# Patient Record
Sex: MALE | Race: WHITE | NOT HISPANIC OR LATINO | Employment: OTHER | URBAN - METROPOLITAN AREA
[De-identification: names, ages, dates, MRNs, and addresses within clinical notes are randomized per-mention and may not be internally consistent; named-entity substitution may affect disease eponyms.]

---

## 2017-04-11 ENCOUNTER — OFFICE VISIT (OUTPATIENT)
Dept: INTERNAL MEDICINE | Facility: CLINIC | Age: 82
End: 2017-04-11
Payer: MEDICARE

## 2017-04-11 VITALS
RESPIRATION RATE: 12 BRPM | WEIGHT: 203.25 LBS | BODY MASS INDEX: 28.45 KG/M2 | SYSTOLIC BLOOD PRESSURE: 139 MMHG | DIASTOLIC BLOOD PRESSURE: 78 MMHG | HEART RATE: 60 BPM | TEMPERATURE: 99 F | HEIGHT: 71 IN

## 2017-04-11 DIAGNOSIS — N40.0 BENIGN PROSTATIC HYPERPLASIA, PRESENCE OF LOWER URINARY TRACT SYMPTOMS UNSPECIFIED, UNSPECIFIED MORPHOLOGY: ICD-10-CM

## 2017-04-11 DIAGNOSIS — R42 DIZZINESS: Primary | ICD-10-CM

## 2017-04-11 DIAGNOSIS — G43.709 CHRONIC MIGRAINE WITHOUT AURA WITHOUT STATUS MIGRAINOSUS, NOT INTRACTABLE: ICD-10-CM

## 2017-04-11 DIAGNOSIS — I10 ESSENTIAL HYPERTENSION: ICD-10-CM

## 2017-04-11 DIAGNOSIS — E78.00 HYPERCHOLESTEREMIA: ICD-10-CM

## 2017-04-11 DIAGNOSIS — N18.30 CKD (CHRONIC KIDNEY DISEASE), STAGE III: ICD-10-CM

## 2017-04-11 PROCEDURE — 99999 PR PBB SHADOW E&M-EST. PATIENT-LVL III: CPT | Mod: PBBFAC,,, | Performed by: INTERNAL MEDICINE

## 2017-04-11 PROCEDURE — 1157F ADVNC CARE PLAN IN RCRD: CPT | Mod: S$GLB,,, | Performed by: INTERNAL MEDICINE

## 2017-04-11 PROCEDURE — 99213 OFFICE O/P EST LOW 20 MIN: CPT | Mod: S$GLB,,, | Performed by: INTERNAL MEDICINE

## 2017-04-11 PROCEDURE — 99499 UNLISTED E&M SERVICE: CPT | Mod: S$GLB,,, | Performed by: INTERNAL MEDICINE

## 2017-04-11 PROCEDURE — 1159F MED LIST DOCD IN RCRD: CPT | Mod: S$GLB,,, | Performed by: INTERNAL MEDICINE

## 2017-04-11 PROCEDURE — 1160F RVW MEDS BY RX/DR IN RCRD: CPT | Mod: S$GLB,,, | Performed by: INTERNAL MEDICINE

## 2017-04-11 PROCEDURE — 1126F AMNT PAIN NOTED NONE PRSNT: CPT | Mod: S$GLB,,, | Performed by: INTERNAL MEDICINE

## 2017-04-11 RX ORDER — MECLIZINE HCL 12.5 MG 12.5 MG/1
12.5 TABLET ORAL 3 TIMES DAILY PRN
Qty: 50 TABLET | Refills: 3 | Status: SHIPPED | OUTPATIENT
Start: 2017-04-11 | End: 2017-07-13

## 2017-04-11 NOTE — PROGRESS NOTES
Subjective:       Patient ID: Gera Moreau is a 87 y.o. male.    Chief Complaint: Dizziness (symptoms x 1 week)    HPI  Pt unsteady on his feet, has positional dizziness lasting for up to a minute at a time w/o claudia vertigo.  Having some tinnitus in his L ear.  He lost hearing completely in his L ear 3 months ago.  Review of Systems    Objective:      Physical Exam   Constitutional: He appears well-developed. No distress.   HENT:   Head: Normocephalic.   Right Ear: External ear normal.   Left Ear: External ear normal.   TMs OK   Eyes: EOM are normal. No scleral icterus.   Neck: Normal range of motion. No tracheal deviation present.   Cardiovascular: Normal rate, regular rhythm and intact distal pulses.    Pulmonary/Chest: Effort normal. No respiratory distress.   Abdominal: He exhibits no distension.   Musculoskeletal: Normal range of motion. He exhibits no edema.   Neurological: He is alert.   Skin: Skin is warm and dry. No rash noted. He is not diaphoretic. No erythema.   Psychiatric: He has a normal mood and affect. His behavior is normal.   Vitals reviewed.      Assessment:       1. Dizziness    2. Essential hypertension    3. Benign prostatic hyperplasia, presence of lower urinary tract symptoms unspecified, unspecified morphology    4. Chronic migraine without aura without status migrainosus, not intractable    5. CKD (chronic kidney disease), stage III    6. Hypercholesteremia        Plan:       Gera was seen today for dizziness.    Diagnoses and all orders for this visit:    Dizziness  -     meclizine (ANTIVERT) 12.5 mg tablet; Take 1 tablet (12.5 mg total) by mouth 3 (three) times daily as needed.    Essential hypertension   Well-cont    Benign prostatic hyperplasia, presence of lower urinary tract symptoms unspecified, unspecified morphology    Chronic migraine without aura without status migrainosus, not intractable    CKD (chronic kidney disease), stage III    Hypercholesteremia      Return if  symptoms worsen or fail to improve.

## 2017-04-11 NOTE — MR AVS SNAPSHOT
[facility]  [address] [place] [address], [address]  [place] [address]  Phone: [phone]  Fax: [phone]                  Gera Moreau   2017 10:15 AM   Office Visit    Description:  Male : 1929   Provider:  Craig Mejia MD   Department:  [facility] Medicine           Reason for Visit     Dizziness           Diagnoses this Visit        Comments    Dizziness    -  Primary     Essential hypertension         Benign prostatic hyperplasia, presence of lower urinary tract symptoms unspecified, unspecified morphology         Chronic migraine without aura without status migrainosus, not intractable         CKD (chronic kidney disease), stage III         Hypercholesteremia                To Do List           Goals (5 Years of Data)     None      Follow-Up and Disposition     Return if symptoms worsen or fail to improve.       These Medications        Disp Refills Start End    meclizine (ANTIVERT) 12.5 mg tablet 50 tablet 3 2017     Take 1 tablet (12.5 mg total) by mouth 3 (three) times daily as needed. - Oral    Pharmacy: [facility]/pharmacy #[id number] - [address] AT [facility] Ph #: [phone]         [facility]     [facility] Nurse Care Line -  Assistance  Unless otherwise directed by your provider, please contact [facility], our nurse care line that is available for  assistance.     Registered nurses in the [facility] provide: appointment scheduling, clinical advisement, health education, and other advisory services.  Call: [phone] (toll free)               Medications           START taking these NEW medications        Refills    meclizine (ANTIVERT) 12.5 mg tablet 3    Sig: Take 1 tablet (12.5 mg total) by mouth 3 (three) times daily as needed.    Class: Normal    Route: Oral           Verify that the below list of medications is an accurate representation of the medications you are  "currently taking.  If none reported, the list may be blank. If incorrect, please contact your healthcare provider. Carry this list with you in case of emergency.           Current Medications     finasteride (PROSCAR) 5 mg tablet TAKE 1 TABLET ONE TIME DAILY    hydrochlorothiazide (MICROZIDE) 12.5 mg capsule TAKE 1 CAPSULE ONE TIME DAILY    metoprolol tartrate (LOPRESSOR) 50 MG tablet TAKE 1 TABLET ONE TIME DAILY    meclizine (ANTIVERT) 12.5 mg tablet Take 1 tablet (12.5 mg total) by mouth 3 (three) times daily as needed.           Clinical Reference Information           Your Vitals Were     BP Pulse Temp Resp Height Weight    139/78 60 98.5 °F (36.9 °C) 12 5' 11" (1.803 m) 92.2 kg (203 lb 4.2 oz)    BMI                28.35 kg/m2          Blood Pressure          Most Recent Value    BP  139/78      Allergies as of 4/11/2017     Pcn [Penicillins]      Immunizations Administered on Date of Encounter - 4/11/2017     None      [facility] Sign-Up     Activating your [facility] account is as easy as 1-2-3!     1) Visit [email or web address], select Sign Up Now, enter this activation code and your date of birth, then select Next.  [email or web address]  Expires: 5/26/2017 10:29 AM      2) Create a username and password to use when you visit [facility] in the future and select a security question in case you lose your password and select Next.    3) Enter your e-mail address and click Sign Up!    Additional Information  If you have questions, please e-mail [email or web address] or call [phone] to talk to our [facility] staff. Remember, [facility] is NOT to be used for urgent needs. For medical emergencies, dial 911.         Language Assistance Services     ATTENTION: Language assistance services are available, free of charge. Please call [phone].      ATENCIÓN: Si habla español, tiene a mckinley disposición servicios gratuitos de asistencia lingüística. Llame al [phone].     CHÚ Ý: N?u b?n nói Ti?ng Vi?t, có các d?ch v? h? " tr? paula cabezas? mi?n phí dành cho b?n. G?i s? [phone].         [facility] complies with applicable Federal civil rights laws and does not discriminate on the basis of race, color, national origin, age, disability, or sex.

## 2017-07-09 ENCOUNTER — HOSPITAL ENCOUNTER (EMERGENCY)
Facility: HOSPITAL | Age: 82
Discharge: HOME | End: 2017-07-09
Attending: EMERGENCY MEDICINE
Payer: MEDICARE

## 2017-07-09 VITALS
SYSTOLIC BLOOD PRESSURE: 154 MMHG | HEART RATE: 88 BPM | TEMPERATURE: 98 F | DIASTOLIC BLOOD PRESSURE: 73 MMHG | RESPIRATION RATE: 18 BRPM | HEIGHT: 72 IN | BODY MASS INDEX: 27.09 KG/M2 | WEIGHT: 200 LBS

## 2017-07-09 DIAGNOSIS — I83.892 BLEEDING FROM VARICOSE VEIN, LEFT: Primary | ICD-10-CM

## 2017-07-09 PROCEDURE — 99284 EMERGENCY DEPT VISIT MOD MDM: CPT

## 2017-07-09 PROCEDURE — 37799 UNLISTED PX VASCULAR SURGERY: CPT | Mod: LT

## 2017-07-09 RX ORDER — LIDOCAINE HYDROCHLORIDE 10 MG/ML
5 INJECTION INFILTRATION; PERINEURAL
Status: DISCONTINUED | OUTPATIENT
Start: 2017-07-09 | End: 2017-07-10 | Stop reason: HOSPADM

## 2017-07-09 RX ORDER — LIDOCAINE HYDROCHLORIDE 10 MG/ML
INJECTION INFILTRATION; PERINEURAL
Status: DISCONTINUED
Start: 2017-07-09 | End: 2017-07-10 | Stop reason: HOSPADM

## 2017-07-09 RX ORDER — LIDOCAINE HYDROCHLORIDE AND EPINEPHRINE 10; 10 MG/ML; UG/ML
1 INJECTION, SOLUTION INFILTRATION; PERINEURAL
Status: DISCONTINUED | OUTPATIENT
Start: 2017-07-09 | End: 2017-07-09

## 2017-07-09 RX ORDER — SILVER NITRATE 38.21; 12.74 MG/1; MG/1
STICK TOPICAL
Status: DISCONTINUED
Start: 2017-07-09 | End: 2017-07-09 | Stop reason: WASHOUT

## 2017-07-10 NOTE — ED PROVIDER NOTES
Encounter Date: 7/9/2017       History     Chief Complaint   Patient presents with    Varicose Veins     Left ankle varicose vein bleeding x15 minutes prior to EMS arrival.  Pressure dressing applied by EMS, bleeding started spontaneously without any hx of injury/trauma     HPI:  89 YO M presents to the ED with a CC of varicose vein bleeding on the left inner ankle. Patient states that approximately 1 hour ago he got out of the shower and was drying off with a towel when the area started bleeding. Patient tried applying pressure x 20 minutes but was unable to control it. He is not on any anticoagulation medications. Patient denies any pain or injury. No numbness or tingling to the left foot.          Review of patient's allergies indicates:   Allergen Reactions    Pcn [penicillins] Rash     Past Medical History:   Diagnosis Date    BPH (benign prostatic hyperplasia)     Gynecomastia     Hyperlipidemia     Hypertension     PSVT (paroxysmal supraventricular tachycardia)     Spondylosis      Past Surgical History:   Procedure Laterality Date    COLONOSCOPY  2009    HERNIA REPAIR  1952    umbilical     Family History   Problem Relation Age of Onset    Cancer Sister     Cancer Brother     Heart disease Brother      Social History   Substance Use Topics    Smoking status: Never Smoker    Smokeless tobacco: Never Used    Alcohol use No     Review of Systems   Constitutional: Negative for chills and fever.   HENT: Negative for sore throat.    Eyes: Negative for visual disturbance.   Respiratory: Negative for shortness of breath.    Cardiovascular: Negative for chest pain.   Gastrointestinal: Negative for abdominal pain, nausea and vomiting.   Genitourinary: Negative for dysuria.   Musculoskeletal: Negative for back pain.   Skin: Negative for rash.        See HPI   Neurological: Negative for dizziness, weakness and numbness.   Hematological: Does not bruise/bleed easily.   All other systems reviewed and are  negative.      Physical Exam     Initial Vitals [07/09/17 2052]   BP Pulse Resp Temp SpO2   (!) 154/73 88 18 98.4 °F (36.9 °C) --      MAP       100         Physical Exam    Nursing note and vitals reviewed.  Constitutional: He appears well-developed and well-nourished.   HENT:   Head: Normocephalic and atraumatic.   Right Ear: External ear normal.   Left Ear: External ear normal.   Mouth/Throat: Oropharynx is clear and moist.   Eyes: EOM are normal. Pupils are equal, round, and reactive to light.   Neck: Normal range of motion. Neck supple.   Cardiovascular: Normal rate, regular rhythm, normal heart sounds and intact distal pulses.   Pulmonary/Chest: Breath sounds normal. No respiratory distress.   Abdominal: Soft.   Musculoskeletal: Normal range of motion.   PT and DP pulse 2+   Neurological: He is alert and oriented to person, place, and time. He has normal strength.   Skin: Skin is warm. Capillary refill takes less than 2 seconds.        Inspection of the left lower extremity reveals a very small superficial vein that is actively bleeding. No ecchymosis. No hematoma noted         ED Course   Procedures  Labs Reviewed - No data to display          Medical Decision Making:   Initial Assessment:   89 YO M presents to the ED with a CC of varicose vein bleeding on the left inner ankle. Patient states that approximately 1 hour ago he got out of the shower and was drying off with a towel when the area started bleeding. Patient tried applying pressure x 20 minutes but was unable to control it. He is not on any anticoagulation medications. Patient denies any pain or injury. No numbness or tingling to the left foot.  Differential Diagnosis:   Bleeding varicose vein  ED Management:  Pressure was applied x 30 minutes. Tried Surgicel to stop bleeding without success. Area cleansed with Betadine and 2 CC of Lidocaine 1% injected in the area. Dr Nichols placed 2 figure 8 Ethilon 5-0 sutures around the vein. Bleeding stopped.  Hemostasis achieved. Patient Neurovasc intact pre and post procedure. Patient tolerated well. PT and DP pulse intact.  Wound dressed with 4x4 and Coban. Patient to f/u with PCP in 3 days for suture removal  Other:   I discussed test(s) with the performing physician.              Attending Attestation:     Physician Attestation Statement for NP/PA:   I have conducted a face to face encounter with this patient in addition to the NP/PA, due to Medical Complexity    Other NP/PA Attestation Additions:    History of Present Illness: Agree   Physical Exam: agree    Procedure Note: Performed by me                ED Course     Clinical Impression:   The encounter diagnosis was Bleeding from varicose vein, left.    Disposition:   Disposition: Discharged  Condition: Stable                        Atilio Nichols MD  07/10/17 0551

## 2017-07-13 ENCOUNTER — OFFICE VISIT (OUTPATIENT)
Dept: INTERNAL MEDICINE | Facility: CLINIC | Age: 82
End: 2017-07-13
Payer: MEDICARE

## 2017-07-13 VITALS
TEMPERATURE: 99 F | WEIGHT: 200.94 LBS | OXYGEN SATURATION: 97 % | DIASTOLIC BLOOD PRESSURE: 64 MMHG | SYSTOLIC BLOOD PRESSURE: 138 MMHG | HEIGHT: 72 IN | HEART RATE: 88 BPM | BODY MASS INDEX: 27.21 KG/M2 | RESPIRATION RATE: 20 BRPM

## 2017-07-13 DIAGNOSIS — I10 ESSENTIAL HYPERTENSION: ICD-10-CM

## 2017-07-13 DIAGNOSIS — N18.30 CKD (CHRONIC KIDNEY DISEASE), STAGE III: ICD-10-CM

## 2017-07-13 DIAGNOSIS — E78.00 HYPERCHOLESTEREMIA: ICD-10-CM

## 2017-07-13 DIAGNOSIS — G43.709 CHRONIC MIGRAINE WITHOUT AURA WITHOUT STATUS MIGRAINOSUS, NOT INTRACTABLE: ICD-10-CM

## 2017-07-13 DIAGNOSIS — N40.0 BENIGN PROSTATIC HYPERPLASIA WITHOUT LOWER URINARY TRACT SYMPTOMS: ICD-10-CM

## 2017-07-13 DIAGNOSIS — Z48.02 VISIT FOR SUTURE REMOVAL: Primary | ICD-10-CM

## 2017-07-13 PROCEDURE — 99213 OFFICE O/P EST LOW 20 MIN: CPT | Mod: S$GLB,,, | Performed by: INTERNAL MEDICINE

## 2017-07-13 PROCEDURE — 1126F AMNT PAIN NOTED NONE PRSNT: CPT | Mod: S$GLB,,, | Performed by: INTERNAL MEDICINE

## 2017-07-13 PROCEDURE — 99999 PR PBB SHADOW E&M-EST. PATIENT-LVL III: CPT | Mod: PBBFAC,,, | Performed by: INTERNAL MEDICINE

## 2017-07-13 PROCEDURE — 1159F MED LIST DOCD IN RCRD: CPT | Mod: S$GLB,,, | Performed by: INTERNAL MEDICINE

## 2017-07-13 PROCEDURE — 99024 POSTOP FOLLOW-UP VISIT: CPT | Mod: S$GLB,,, | Performed by: INTERNAL MEDICINE

## 2017-07-13 PROCEDURE — 99499 UNLISTED E&M SERVICE: CPT | Mod: S$GLB,,, | Performed by: INTERNAL MEDICINE

## 2017-10-09 ENCOUNTER — LAB VISIT (OUTPATIENT)
Dept: LAB | Facility: HOSPITAL | Age: 82
End: 2017-10-09
Attending: INTERNAL MEDICINE
Payer: MEDICARE

## 2017-10-09 DIAGNOSIS — N18.30 CKD (CHRONIC KIDNEY DISEASE), STAGE III: ICD-10-CM

## 2017-10-09 DIAGNOSIS — E78.00 HYPERCHOLESTEREMIA: ICD-10-CM

## 2017-10-09 LAB
ALBUMIN SERPL BCP-MCNC: 4.2 G/DL
ALP SERPL-CCNC: 82 U/L
ALT SERPL W/O P-5'-P-CCNC: 27 U/L
ANION GAP SERPL CALC-SCNC: 10 MMOL/L
AST SERPL-CCNC: 24 U/L
BILIRUB SERPL-MCNC: 1.5 MG/DL
BUN SERPL-MCNC: 18 MG/DL
CALCIUM SERPL-MCNC: 9.4 MG/DL
CHLORIDE SERPL-SCNC: 101 MMOL/L
CHOLEST SERPL-MCNC: 166 MG/DL
CHOLEST/HDLC SERPL: 4 {RATIO}
CO2 SERPL-SCNC: 30 MMOL/L
CREAT SERPL-MCNC: 0.8 MG/DL
EST. GFR  (AFRICAN AMERICAN): >60 ML/MIN/1.73 M^2
EST. GFR  (NON AFRICAN AMERICAN): >60 ML/MIN/1.73 M^2
GLUCOSE SERPL-MCNC: 106 MG/DL
HDLC SERPL-MCNC: 42 MG/DL
HDLC SERPL: 25.3 %
LDLC SERPL CALC-MCNC: 103.6 MG/DL
NONHDLC SERPL-MCNC: 124 MG/DL
POTASSIUM SERPL-SCNC: 3.3 MMOL/L
PROT SERPL-MCNC: 6.5 G/DL
SODIUM SERPL-SCNC: 141 MMOL/L
TRIGL SERPL-MCNC: 102 MG/DL

## 2017-10-09 PROCEDURE — 80053 COMPREHEN METABOLIC PANEL: CPT | Mod: PO

## 2017-10-09 PROCEDURE — 80061 LIPID PANEL: CPT

## 2017-10-09 PROCEDURE — 36415 COLL VENOUS BLD VENIPUNCTURE: CPT | Mod: PO

## 2017-10-13 ENCOUNTER — OFFICE VISIT (OUTPATIENT)
Dept: INTERNAL MEDICINE | Facility: CLINIC | Age: 82
End: 2017-10-13
Payer: MEDICARE

## 2017-10-13 VITALS
HEART RATE: 60 BPM | RESPIRATION RATE: 20 BRPM | SYSTOLIC BLOOD PRESSURE: 114 MMHG | HEIGHT: 72 IN | WEIGHT: 200.5 LBS | TEMPERATURE: 97 F | BODY MASS INDEX: 27.16 KG/M2 | DIASTOLIC BLOOD PRESSURE: 78 MMHG

## 2017-10-13 DIAGNOSIS — G43.709 CHRONIC MIGRAINE WITHOUT AURA WITHOUT STATUS MIGRAINOSUS, NOT INTRACTABLE: ICD-10-CM

## 2017-10-13 DIAGNOSIS — G47.62 NOCTURNAL LEG CRAMPS: ICD-10-CM

## 2017-10-13 DIAGNOSIS — L30.9 ECZEMA, UNSPECIFIED TYPE: ICD-10-CM

## 2017-10-13 DIAGNOSIS — E78.00 HYPERCHOLESTEREMIA: ICD-10-CM

## 2017-10-13 DIAGNOSIS — Z23 NEED FOR PROPHYLACTIC VACCINATION AND INOCULATION AGAINST INFLUENZA: ICD-10-CM

## 2017-10-13 DIAGNOSIS — I10 ESSENTIAL HYPERTENSION: Primary | ICD-10-CM

## 2017-10-13 DIAGNOSIS — N40.0 BENIGN PROSTATIC HYPERPLASIA WITHOUT LOWER URINARY TRACT SYMPTOMS: ICD-10-CM

## 2017-10-13 DIAGNOSIS — N18.30 CKD (CHRONIC KIDNEY DISEASE), STAGE III: ICD-10-CM

## 2017-10-13 PROCEDURE — 99999 PR PBB SHADOW E&M-EST. PATIENT-LVL III: CPT | Mod: PBBFAC,,, | Performed by: INTERNAL MEDICINE

## 2017-10-13 PROCEDURE — 99499 UNLISTED E&M SERVICE: CPT | Mod: S$GLB,,, | Performed by: INTERNAL MEDICINE

## 2017-10-13 PROCEDURE — G0008 ADMIN INFLUENZA VIRUS VAC: HCPCS | Mod: S$GLB,,, | Performed by: INTERNAL MEDICINE

## 2017-10-13 PROCEDURE — 90662 IIV NO PRSV INCREASED AG IM: CPT | Mod: S$GLB,,, | Performed by: INTERNAL MEDICINE

## 2017-10-13 PROCEDURE — 99214 OFFICE O/P EST MOD 30 MIN: CPT | Mod: S$GLB,,, | Performed by: INTERNAL MEDICINE

## 2017-10-13 RX ORDER — CLOBETASOL PROPIONATE 0.5 MG/G
OINTMENT TOPICAL 2 TIMES DAILY
Qty: 60 G | Refills: 3 | Status: SHIPPED | OUTPATIENT
Start: 2017-10-13 | End: 2019-01-22 | Stop reason: SDUPTHER

## 2017-10-13 RX ORDER — TIZANIDINE HYDROCHLORIDE 2 MG/1
1 CAPSULE, GELATIN COATED ORAL NIGHTLY
Qty: 30 CAPSULE | Refills: 2 | Status: SHIPPED | OUTPATIENT
Start: 2017-10-13 | End: 2017-10-23

## 2017-10-13 NOTE — PROGRESS NOTES
Subjective:       Patient ID: Gera Moreau is a 88 y.o. male.    Chief Complaint: Follow-up and Flu Vaccine    HPI  Checkup.  Labs reviewed.  C/O leg cramps qhs, L foot frequently burns.  No claudication.  No recent HA.  No CP, SOB.  Nocturia x 1.  No significant arthralgias.  Review of Systems   All other systems reviewed and are negative.      Objective:      Physical Exam   Constitutional: He appears well-developed. No distress.   HENT:   Head: Normocephalic.   Eyes: EOM are normal. No scleral icterus.   Neck: Normal range of motion. No tracheal deviation present.   Cardiovascular: Normal rate, regular rhythm, normal heart sounds and intact distal pulses.    Pulmonary/Chest: Effort normal and breath sounds normal. No respiratory distress.   Abdominal: Soft. Bowel sounds are normal. He exhibits no distension.   Musculoskeletal: Normal range of motion. He exhibits edema (1+ ankles).   Neurological: He is alert.   Skin: Skin is warm and dry. No rash noted. He is not diaphoretic. No erythema.   Psychiatric: He has a normal mood and affect. His behavior is normal.   Vitals reviewed.      Assessment:       1. Essential hypertension    2. Chronic migraine without aura without status migrainosus, not intractable    3. CKD (chronic kidney disease), stage III    4. Hypercholesteremia    5. Benign prostatic hyperplasia without lower urinary tract symptoms    6. Nocturnal leg cramps    7. Need for prophylactic vaccination and inoculation against influenza    8. Eczema, unspecified type        Plan:       Gera was seen today for follow-up and flu vaccine.    Diagnoses and all orders for this visit:    Essential hypertension    Well-cont    Chronic migraine without aura without status migrainosus, not intractable   Stable    CKD (chronic kidney disease), stage III   Stable    Hypercholesteremia   Well-cont    Benign prostatic hyperplasia without lower urinary tract symptoms   Stable    Nocturnal leg cramps  -      tizanidine 2 mg Cap; Take 1 capsule (2 mg total) by mouth every evening.    Need for prophylactic vaccination and inoculation against influenza  -     Influenza - High Dose (65+) (PF) (IM)    Eczema, unspecified type  -     clobetasol 0.05% (TEMOVATE) 0.05 % Oint; Apply topically 2 (two) times daily.      Return in about 6 months (around 4/13/2018).

## 2017-11-08 RX ORDER — HYDROCHLOROTHIAZIDE 12.5 MG/1
CAPSULE ORAL
Qty: 90 CAPSULE | Refills: 4 | Status: SHIPPED | OUTPATIENT
Start: 2017-11-08 | End: 2018-06-13

## 2017-11-08 RX ORDER — FINASTERIDE 5 MG/1
TABLET, FILM COATED ORAL
Qty: 90 TABLET | Refills: 4 | Status: SHIPPED | OUTPATIENT
Start: 2017-11-08 | End: 2019-01-22 | Stop reason: SDUPTHER

## 2017-11-08 RX ORDER — METOPROLOL TARTRATE 50 MG/1
TABLET ORAL
Qty: 90 TABLET | Refills: 4 | Status: SHIPPED | OUTPATIENT
Start: 2017-11-08 | End: 2018-06-13

## 2018-01-16 ENCOUNTER — PES CALL (OUTPATIENT)
Dept: ADMINISTRATIVE | Facility: CLINIC | Age: 83
End: 2018-01-16

## 2018-05-16 ENCOUNTER — OFFICE VISIT (OUTPATIENT)
Dept: INTERNAL MEDICINE | Facility: CLINIC | Age: 83
End: 2018-05-16
Payer: MEDICARE

## 2018-05-16 VITALS
HEART RATE: 80 BPM | SYSTOLIC BLOOD PRESSURE: 144 MMHG | DIASTOLIC BLOOD PRESSURE: 70 MMHG | RESPIRATION RATE: 18 BRPM | BODY MASS INDEX: 27.53 KG/M2 | WEIGHT: 203.25 LBS | HEIGHT: 72 IN | TEMPERATURE: 99 F

## 2018-05-16 DIAGNOSIS — D69.2 SENILE PURPURA: ICD-10-CM

## 2018-05-16 DIAGNOSIS — N41.0 ACUTE PROSTATITIS: Primary | ICD-10-CM

## 2018-05-16 DIAGNOSIS — I10 ESSENTIAL HYPERTENSION: ICD-10-CM

## 2018-05-16 LAB
BILIRUB SERPL-MCNC: ABNORMAL MG/DL
BLOOD URINE, POC: ABNORMAL
COLOR, POC UA: ABNORMAL
GLUCOSE UR QL STRIP: ABNORMAL
KETONES UR QL STRIP: ABNORMAL
LEUKOCYTE ESTERASE URINE, POC: ABNORMAL
NITRITE, POC UA: ABNORMAL
PH, POC UA: 8
PROTEIN, POC: ABNORMAL
SPECIFIC GRAVITY, POC UA: 1.02
UROBILINOGEN, POC UA: 1

## 2018-05-16 PROCEDURE — 99499 UNLISTED E&M SERVICE: CPT | Mod: S$GLB,,, | Performed by: INTERNAL MEDICINE

## 2018-05-16 PROCEDURE — 81002 URINALYSIS NONAUTO W/O SCOPE: CPT | Mod: S$GLB,,, | Performed by: INTERNAL MEDICINE

## 2018-05-16 PROCEDURE — 99213 OFFICE O/P EST LOW 20 MIN: CPT | Mod: 25,S$GLB,, | Performed by: INTERNAL MEDICINE

## 2018-05-16 PROCEDURE — 99999 PR PBB SHADOW E&M-EST. PATIENT-LVL III: CPT | Mod: PBBFAC,,, | Performed by: INTERNAL MEDICINE

## 2018-05-16 RX ORDER — CIPROFLOXACIN 250 MG/1
250 TABLET, FILM COATED ORAL 2 TIMES DAILY
Qty: 28 TABLET | Refills: 0 | Status: SHIPPED | OUTPATIENT
Start: 2018-05-16 | End: 2018-05-30

## 2018-05-16 NOTE — PROGRESS NOTES
Subjective:       Patient ID: Gera Moreau is a 88 y.o. male.    Chief Complaint: Urinary Tract Infection (burning, cloudy and blood noticed in urine x 2 days)    HPI  Pt with 2 days of dyuria and hematuria, passing clots.  No F/C, no back pain.  Review of Systems    Objective:      Physical Exam   Constitutional: He appears well-developed. No distress.   HENT:   Head: Normocephalic.   Eyes: EOM are normal. No scleral icterus.   Neck: Normal range of motion. No tracheal deviation present.   Cardiovascular: Normal rate, regular rhythm and intact distal pulses.    Pulmonary/Chest: Effort normal. No respiratory distress.   Abdominal: He exhibits no distension.   Musculoskeletal: Normal range of motion. He exhibits no edema.   Neurological: He is alert.   Skin: Skin is warm and dry. No rash noted. He is not diaphoretic. No erythema.   Purpura UEs   Psychiatric: He has a normal mood and affect. His behavior is normal.   Vitals reviewed.      Assessment:       1. Acute prostatitis    2. Essential hypertension    3. Senile purpura        Plan:       Gera was seen today for urinary tract infection.    Diagnoses and all orders for this visit:    Acute prostatitis  -     ciprofloxacin HCl (CIPRO) 250 MG tablet; Take 1 tablet (250 mg total) by mouth 2 (two) times daily.  -     POCT urine dipstick without microscope    Essential hypertension  -     Renal function panel; Future    Senile purpura   observe    Follow-up in about 1 month (around 6/16/2018).

## 2018-06-08 ENCOUNTER — LAB VISIT (OUTPATIENT)
Dept: LAB | Facility: HOSPITAL | Age: 83
End: 2018-06-08
Attending: INTERNAL MEDICINE
Payer: MEDICARE

## 2018-06-08 DIAGNOSIS — I10 ESSENTIAL HYPERTENSION: ICD-10-CM

## 2018-06-08 LAB
ALBUMIN SERPL BCP-MCNC: 4.1 G/DL
ANION GAP SERPL CALC-SCNC: 9 MMOL/L
BUN SERPL-MCNC: 15 MG/DL
CALCIUM SERPL-MCNC: 9.6 MG/DL
CHLORIDE SERPL-SCNC: 104 MMOL/L
CO2 SERPL-SCNC: 31 MMOL/L
CREAT SERPL-MCNC: 0.81 MG/DL
EST. GFR  (AFRICAN AMERICAN): >60 ML/MIN/1.73 M^2
EST. GFR  (NON AFRICAN AMERICAN): >60 ML/MIN/1.73 M^2
GLUCOSE SERPL-MCNC: 113 MG/DL
PHOSPHATE SERPL-MCNC: 3.1 MG/DL
POTASSIUM SERPL-SCNC: 4.7 MMOL/L
SODIUM SERPL-SCNC: 144 MMOL/L

## 2018-06-08 PROCEDURE — 36415 COLL VENOUS BLD VENIPUNCTURE: CPT | Mod: PO

## 2018-06-08 PROCEDURE — 80069 RENAL FUNCTION PANEL: CPT | Mod: PO

## 2018-06-13 ENCOUNTER — OFFICE VISIT (OUTPATIENT)
Dept: INTERNAL MEDICINE | Facility: CLINIC | Age: 83
End: 2018-06-13
Payer: MEDICARE

## 2018-06-13 ENCOUNTER — TELEPHONE (OUTPATIENT)
Dept: INTERNAL MEDICINE | Facility: CLINIC | Age: 83
End: 2018-06-13

## 2018-06-13 ENCOUNTER — LAB VISIT (OUTPATIENT)
Dept: LAB | Facility: HOSPITAL | Age: 83
End: 2018-06-13
Attending: INTERNAL MEDICINE
Payer: MEDICARE

## 2018-06-13 VITALS
TEMPERATURE: 98 F | RESPIRATION RATE: 12 BRPM | SYSTOLIC BLOOD PRESSURE: 108 MMHG | DIASTOLIC BLOOD PRESSURE: 64 MMHG | BODY MASS INDEX: 27.67 KG/M2 | HEART RATE: 36 BPM | WEIGHT: 204.25 LBS | HEIGHT: 72 IN

## 2018-06-13 DIAGNOSIS — I10 ESSENTIAL HYPERTENSION: ICD-10-CM

## 2018-06-13 DIAGNOSIS — R00.1 SINUS BRADYCARDIA: Primary | ICD-10-CM

## 2018-06-13 DIAGNOSIS — R00.1 SINUS BRADYCARDIA: ICD-10-CM

## 2018-06-13 DIAGNOSIS — M72.2 PLANTAR FASCIITIS: ICD-10-CM

## 2018-06-13 LAB — TSH SERPL DL<=0.005 MIU/L-ACNC: 2 UIU/ML

## 2018-06-13 PROCEDURE — 93005 ELECTROCARDIOGRAM TRACING: CPT | Mod: S$GLB,,, | Performed by: INTERNAL MEDICINE

## 2018-06-13 PROCEDURE — 99999 PR PBB SHADOW E&M-EST. PATIENT-LVL III: CPT | Mod: PBBFAC,,, | Performed by: INTERNAL MEDICINE

## 2018-06-13 PROCEDURE — 99214 OFFICE O/P EST MOD 30 MIN: CPT | Mod: S$GLB,,, | Performed by: INTERNAL MEDICINE

## 2018-06-13 PROCEDURE — 36415 COLL VENOUS BLD VENIPUNCTURE: CPT | Mod: PO

## 2018-06-13 PROCEDURE — 84443 ASSAY THYROID STIM HORMONE: CPT | Mod: PO

## 2018-06-13 PROCEDURE — 99499 UNLISTED E&M SERVICE: CPT | Mod: S$GLB,,, | Performed by: INTERNAL MEDICINE

## 2018-06-13 PROCEDURE — 93010 ELECTROCARDIOGRAM REPORT: CPT | Mod: S$GLB,,, | Performed by: INTERNAL MEDICINE

## 2018-06-13 RX ORDER — LOSARTAN POTASSIUM AND HYDROCHLOROTHIAZIDE 12.5; 5 MG/1; MG/1
1 TABLET ORAL DAILY
Qty: 90 TABLET | Refills: 3 | Status: SHIPPED | OUTPATIENT
Start: 2018-06-13 | End: 2019-06-14 | Stop reason: SDUPTHER

## 2018-06-13 NOTE — PROGRESS NOTES
Subjective:       Patient ID: Gera Moreau is a 88 y.o. male.    Chief Complaint: Fatigue (c/o low pulse rate x 2 days) and Heel Pain (symptoms to right foot x 2 weeks, denies injury)    HPI  Pt c/o fatigue and weakness x 2 days, unable to do light yard work.  Denies CP, SOB.  Also c/o 2 weeks of R heel pain worse after resting.  Review of Systems   All other systems reviewed and are negative.      Objective:      Physical Exam   Constitutional: He appears well-developed. No distress.   HENT:   Head: Normocephalic.   Eyes: EOM are normal. No scleral icterus.   Neck: Normal range of motion. No tracheal deviation present.   Cardiovascular: Regular rhythm, normal heart sounds and intact distal pulses.    Bradycardic 36-44   Pulmonary/Chest: Effort normal and breath sounds normal. No respiratory distress.   Abdominal: Soft. Bowel sounds are normal. He exhibits no distension. There is no tenderness.   Musculoskeletal: Normal range of motion. He exhibits edema (trace ankles) and tenderness (R heel).   Neurological: He is alert.   Skin: Skin is warm and dry. No rash noted. He is not diaphoretic. No erythema.   Psychiatric: He has a normal mood and affect. His behavior is normal.   Vitals reviewed.      Assessment:       1. Sinus bradycardia    2. Essential hypertension    3. Plantar fasciitis        Plan:       Gera was seen today for fatigue and heel pain.    Diagnoses and all orders for this visit:    Sinus bradycardia   D/C lopressor   TSH    Essential hypertension  -     EKG 12-lead; Future   Hyzaar 50/12.5 qd    Plantar fasciitis   Arch supports    No Follow-up on file.

## 2018-06-13 NOTE — PATIENT INSTRUCTIONS
Plantar Fasciitis  Plantar fasciitis is a painful swelling of the plantar fascia. The plantar fascia is a thick, fibrous layer of tissue. It covers the bones on the bottom of your foot. And it supports the foot bones in an arched position.  This can happen gradually or suddenly. It usually affects one foot at a time. Heel pain can be sharp, like a knife sticking into the bottom of your foot. You may feel pain after exercising, long-distance jogging, stair climbing, long periods of standing, or after standing up.  Risk factors include: non-active lifestyle, arthritis, diabetes, obesity or recent weight gain, flat foot, high arch. Wearing high heels, loose shoes, or shoes with poor arch support for long periods of time adds to the risk. This problem is commonly found in runners and dancers. It also found in people who stand on hard surfaces for long periods of time.  Foot pain from this condition is usually worse in the morning. But it improves with walking. By the end of the day there may be a dull aching. Treatment requires short-term rest and controlling swelling. It may take up to 9 months before all symptoms go away. Rarely, a steroid injection into the foot, or surgery, may be needed.  Home care  · If you are overweight, lose weight to help healing.  · Choose supportive shoes with good arch support and shock absorbency. Replace athletic shoes when they become worn out. Dont walk or run barefoot.  · Premade or custom-fitted shoe inserts may be helpful. Inserts made of silicone seem to be the most effective. Custom-made inserts can be provided by a podiatrist or foot specialist, physical therapist, or orthopedist.  · Premade or custom-made night splints keep the heel stretched out while you sleep. They may prevent morning pain.  · Avoid activities that stress the feet: jogging, prolonged standing or walking, contact sports, etc.  · First thing in the morning and before sports, stretch the bottom of your feet.  Gently flex your ankle so the toes move toward your knee.  · Icing may help control heel pain. Apply an ice pack to the heel for 10-20 minutes as a preventive. Or ice your heel after a severe flare-up of symptoms. You may repeat this every 1-2 hours as needed.  · You may use over-the-counter pain medicine to control pain, unless another medicine was prescribed. Anti-inflammatory pain medicines, such as ibuprofen or naproxen, may work better than acetaminophen. If you have chronic liver or kidney disease or ever had a stomach ulcer or GI bleeding, talk with your healthcare provider before using these medicines.  Follow-up care  Follow up with your healthcare provider, physical therapist, or podiatrist or foot specialist as advised.  Call for an appointment if pain worsens or there is no relief after a few weeks of home treatment. Shoe inserts, a night splint, or a special boot may be required.  If X-rays were taken, you will be told of any new findings that may affect your care.  When to seek medical advice  Call your healthcare provider right away if any of these occur:  · Foot swelling  · Redness with increasing pain  Date Last Reviewed: 11/21/2015  © 5948-2465 [employer]. [address] [place] [address]. All rights reserved. This information is not intended as a substitute for professional medical care. Always follow your healthcare professional's instructions.        Treating Plantar Fasciitis  First, your healthcare provider tries to determine the cause of your problem in order to suggest ways to relieve pain. If your pain is due to poor foot mechanics, custom-made shoe inserts (orthoses) may help.    Reduce symptoms  · To relieve mild symptoms, try aspirin, ibuprofen, or other medicines as directed. Rubbing ice on the affected area may also help.  · To reduce severe pain and swelling, your healthcare provider may prescribe pills or injections or a walking cast in some instances.  Physical therapy, such as ultrasound or a daily stretching program, may also be recommended. Surgery is rarely required.  · To reduce symptoms caused by poor foot mechanics, your foot may be taped. This supports the arch and temporarily controls movement. Night splints may also help by stretching the fascia.  Control movement  If taping helps, your healthcare provider may prescribe orthoses. Built from plaster casts of your feet, these inserts control the way your foot moves. As a result, your symptoms should go away.  Reduce overuse  Every time your foot strikes the ground, the plantar fascia is stretched. You can reduce the strain on the plantar fascia and the possibility of overuse by following these suggestions:  · Lose any excess weight.  · Avoid running on hard or uneven ground.  · Use orthoses at all times in your shoes and house slippers.  If surgery is needed  Your healthcare provider may consider surgery if other types of treatment don't control your pain. During surgery, the plantar fascia is partially cut to release tension. As you heal, fibrous tissue fills the space between the heel bone and the plantar fascia.   Date Last Reviewed: 10/14/2015  © 8221-7037 [employer]. [address] [place] [address]. All rights reserved. This information is not intended as a substitute for professional medical care. Always follow your healthcare professional's instructions.

## 2018-06-13 NOTE — TELEPHONE ENCOUNTER
----- Message from Sonam Ren sent at 6/13/2018 12:01 PM CDT -----  Contact: self / [phone]  Patient wife called to see if her  can come in today , his pulse is low and he is fatigue. Wife is concerned. Please advise

## 2018-06-20 ENCOUNTER — OFFICE VISIT (OUTPATIENT)
Dept: INTERNAL MEDICINE | Facility: CLINIC | Age: 83
End: 2018-06-20
Payer: MEDICARE

## 2018-06-20 VITALS
WEIGHT: 204.13 LBS | HEIGHT: 72 IN | OXYGEN SATURATION: 97 % | BODY MASS INDEX: 27.65 KG/M2 | DIASTOLIC BLOOD PRESSURE: 70 MMHG | SYSTOLIC BLOOD PRESSURE: 122 MMHG | HEART RATE: 70 BPM | TEMPERATURE: 97 F

## 2018-06-20 DIAGNOSIS — R00.1 SINUS BRADYCARDIA: Primary | ICD-10-CM

## 2018-06-20 DIAGNOSIS — M72.2 PLANTAR FASCIITIS: ICD-10-CM

## 2018-06-20 DIAGNOSIS — I10 ESSENTIAL HYPERTENSION: ICD-10-CM

## 2018-06-20 PROCEDURE — 99999 PR PBB SHADOW E&M-EST. PATIENT-LVL III: CPT | Mod: PBBFAC,,, | Performed by: INTERNAL MEDICINE

## 2018-06-20 PROCEDURE — 99213 OFFICE O/P EST LOW 20 MIN: CPT | Mod: S$GLB,,, | Performed by: INTERNAL MEDICINE

## 2018-06-20 NOTE — PROGRESS NOTES
Subjective:       Patient ID: Gera Moreau is a 89 y.o. male.    Chief Complaint: Follow-up (1 week)    HPI  Recheck for bradycardia.  Feeling better off of the metoprolol, no weakness or lightheadedness.  R foot also feeling better.  Review of Systems    Objective:      Physical Exam   Constitutional: He appears well-developed. No distress.   HENT:   Head: Normocephalic.   Eyes: EOM are normal. No scleral icterus.   Neck: Normal range of motion. No tracheal deviation present.   Cardiovascular: Normal rate, regular rhythm and intact distal pulses.    Pulmonary/Chest: Effort normal. No respiratory distress.   Abdominal: He exhibits no distension.   Musculoskeletal: Normal range of motion. He exhibits no edema.   Neurological: He is alert.   Skin: Skin is warm and dry. No rash noted. He is not diaphoretic. No erythema.   Psychiatric: He has a normal mood and affect. His behavior is normal.   Vitals reviewed.      Assessment:       1. Sinus bradycardia    2. Essential hypertension    3. Plantar fasciitis        Plan:       Gera was seen today for follow-up.    Diagnoses and all orders for this visit:    Sinus bradycardia   Resolved    Essential hypertension   Well-cont    Plantar fasciitis   Continue arch supports    Follow-up in about 6 months (around 12/20/2018).

## 2018-07-20 ENCOUNTER — PES CALL (OUTPATIENT)
Dept: ADMINISTRATIVE | Facility: CLINIC | Age: 83
End: 2018-07-20

## 2018-09-24 ENCOUNTER — CLINICAL SUPPORT (OUTPATIENT)
Dept: INTERNAL MEDICINE | Facility: CLINIC | Age: 83
End: 2018-09-24
Payer: MEDICARE

## 2018-09-24 DIAGNOSIS — Z23 FLU VACCINE NEED: Primary | ICD-10-CM

## 2018-09-24 PROCEDURE — 90662 IIV NO PRSV INCREASED AG IM: CPT | Mod: PBBFAC,PN

## 2018-09-24 NOTE — PROGRESS NOTES
Patient here for flu vaccine, no pain noted. Injection given, tolerated well. Advised to wait in lobby 15 minutes. Report any adverse reactions.

## 2019-01-22 ENCOUNTER — OFFICE VISIT (OUTPATIENT)
Dept: INTERNAL MEDICINE | Facility: CLINIC | Age: 84
End: 2019-01-22
Payer: MEDICARE

## 2019-01-22 VITALS
HEIGHT: 72 IN | OXYGEN SATURATION: 95 % | HEART RATE: 60 BPM | WEIGHT: 206.81 LBS | SYSTOLIC BLOOD PRESSURE: 130 MMHG | DIASTOLIC BLOOD PRESSURE: 80 MMHG | BODY MASS INDEX: 28.01 KG/M2

## 2019-01-22 DIAGNOSIS — R31.0 GROSS HEMATURIA: Primary | ICD-10-CM

## 2019-01-22 DIAGNOSIS — D69.2 SENILE PURPURA: ICD-10-CM

## 2019-01-22 DIAGNOSIS — L30.9 ECZEMA, UNSPECIFIED TYPE: ICD-10-CM

## 2019-01-22 DIAGNOSIS — N40.0 BENIGN PROSTATIC HYPERPLASIA WITHOUT LOWER URINARY TRACT SYMPTOMS: ICD-10-CM

## 2019-01-22 DIAGNOSIS — J06.9 UPPER RESPIRATORY TRACT INFECTION, UNSPECIFIED TYPE: ICD-10-CM

## 2019-01-22 DIAGNOSIS — I10 ESSENTIAL HYPERTENSION: ICD-10-CM

## 2019-01-22 PROBLEM — N18.30 CKD (CHRONIC KIDNEY DISEASE), STAGE III: Status: RESOLVED | Noted: 2017-07-13 | Resolved: 2019-01-22

## 2019-01-22 LAB
BILIRUB SERPL-MCNC: NEGATIVE MG/DL
BLOOD URINE, POC: NEGATIVE
COLOR, POC UA: YELLOW
GLUCOSE UR QL STRIP: NEGATIVE
KETONES UR QL STRIP: NEGATIVE
LEUKOCYTE ESTERASE URINE, POC: NEGATIVE
NITRITE, POC UA: NEGATIVE
PH, POC UA: 6
PROTEIN, POC: NEGATIVE
SPECIFIC GRAVITY, POC UA: 1.01
UROBILINOGEN, POC UA: 0.2

## 2019-01-22 PROCEDURE — 1101F PR PT FALLS ASSESS DOC 0-1 FALLS W/OUT INJ PAST YR: ICD-10-PCS | Mod: CPTII,S$GLB,, | Performed by: INTERNAL MEDICINE

## 2019-01-22 PROCEDURE — 99999 PR PBB SHADOW E&M-EST. PATIENT-LVL III: CPT | Mod: PBBFAC,,, | Performed by: INTERNAL MEDICINE

## 2019-01-22 PROCEDURE — 1101F PT FALLS ASSESS-DOCD LE1/YR: CPT | Mod: CPTII,S$GLB,, | Performed by: INTERNAL MEDICINE

## 2019-01-22 PROCEDURE — 99214 OFFICE O/P EST MOD 30 MIN: CPT | Mod: 25,S$GLB,, | Performed by: INTERNAL MEDICINE

## 2019-01-22 PROCEDURE — 81002 URINALYSIS NONAUTO W/O SCOPE: CPT | Mod: S$GLB,,, | Performed by: INTERNAL MEDICINE

## 2019-01-22 PROCEDURE — 99214 PR OFFICE/OUTPT VISIT, EST, LEVL IV, 30-39 MIN: ICD-10-PCS | Mod: 25,S$GLB,, | Performed by: INTERNAL MEDICINE

## 2019-01-22 PROCEDURE — 81002 POCT URINE DIPSTICK WITHOUT MICROSCOPE: ICD-10-PCS | Mod: S$GLB,,, | Performed by: INTERNAL MEDICINE

## 2019-01-22 PROCEDURE — 99499 RISK ADDL DX/OHS AUDIT: ICD-10-PCS | Mod: S$GLB,,, | Performed by: INTERNAL MEDICINE

## 2019-01-22 PROCEDURE — 99499 UNLISTED E&M SERVICE: CPT | Mod: S$GLB,,, | Performed by: INTERNAL MEDICINE

## 2019-01-22 PROCEDURE — 99999 PR PBB SHADOW E&M-EST. PATIENT-LVL III: ICD-10-PCS | Mod: PBBFAC,,, | Performed by: INTERNAL MEDICINE

## 2019-01-22 RX ORDER — CLOBETASOL PROPIONATE 0.5 MG/G
OINTMENT TOPICAL 2 TIMES DAILY
Qty: 60 G | Refills: 3 | Status: SHIPPED | OUTPATIENT
Start: 2019-01-22 | End: 2020-10-19 | Stop reason: SDUPTHER

## 2019-01-22 RX ORDER — FINASTERIDE 5 MG/1
TABLET, FILM COATED ORAL
Qty: 90 TABLET | Refills: 4 | Status: SHIPPED | OUTPATIENT
Start: 2019-01-22 | End: 2020-03-23

## 2019-01-22 RX ORDER — HYDROCODONE BITARTRATE AND HOMATROPINE METHYLBROMIDE ORAL SOLUTION 5; 1.5 MG/5ML; MG/5ML
5 LIQUID ORAL EVERY 4 HOURS PRN
Qty: 175 ML | Refills: 0 | Status: SHIPPED | OUTPATIENT
Start: 2019-01-22 | End: 2019-02-01

## 2019-01-22 NOTE — PROGRESS NOTES
Subjective:       Patient ID: Gera Moreau is a 89 y.o. male.    Chief Complaint: Generalized Body Aches; Urinary Tract Infection (possible); and Chest Pain (on right side when coughing)    HPI  Pt c/o myalgias, cough, CP with cough.  No SOB, F/C.  Also c/o gross hematuria, consisting of a few drops of blood before urinary stram every 12-14 days.  No dysuria or frequency.  Has some mild intermittent R sided abd pain.  Review of Systems   All other systems reviewed and are negative.      Objective:      Physical Exam   Constitutional: He appears well-developed. No distress.   HENT:   Head: Normocephalic.   Eyes: EOM are normal. No scleral icterus.   Neck: Normal range of motion. No tracheal deviation present.   Cardiovascular: Normal rate, regular rhythm, normal heart sounds and intact distal pulses.   Pulmonary/Chest: Effort normal and breath sounds normal. No respiratory distress.   Abdominal: Soft. Bowel sounds are normal. He exhibits no distension.   Musculoskeletal: Normal range of motion. He exhibits no edema.   Neurological: He is alert.   Skin: Skin is warm and dry. No rash noted. He is not diaphoretic. No erythema.   Purpuric exts   Psychiatric: He has a normal mood and affect. His behavior is normal.   Vitals reviewed.      Assessment:       1. Gross hematuria    2. Upper respiratory tract infection, unspecified type    3. Eczema, unspecified type    4. Benign prostatic hyperplasia without lower urinary tract symptoms    5. Essential hypertension    6. Senile purpura        Plan:       Gera was seen today for generalized body aches, urinary tract infection and chest pain.    Diagnoses and all orders for this visit:    Gross hematuria  -     POCT urine dipstick without microscope    Upper respiratory tract infection, unspecified type  -     hydrocodone-homatropine 5-1.5 mg/5 ml (HYCODAN) 5-1.5 mg/5 mL Syrp; Take 5 mLs by mouth every 4 (four) hours as needed (cough).    Eczema, unspecified type  -      clobetasol 0.05% (TEMOVATE) 0.05 % Oint; Apply topically 2 (two) times daily. for 10 days    Benign prostatic hyperplasia without lower urinary tract symptoms  -     finasteride (PROSCAR) 5 mg tablet; TAKE 1 TABLET ONE TIME DAILY    Essential hypertension   Well-cont    Senile purpura   monitor    No Follow-up on file.

## 2019-03-09 ENCOUNTER — HOSPITAL ENCOUNTER (EMERGENCY)
Facility: HOSPITAL | Age: 84
Discharge: HOME | End: 2019-03-09
Attending: EMERGENCY MEDICINE
Payer: MEDICARE

## 2019-03-09 VITALS
HEIGHT: 73 IN | RESPIRATION RATE: 18 BRPM | HEART RATE: 57 BPM | DIASTOLIC BLOOD PRESSURE: 66 MMHG | TEMPERATURE: 99 F | OXYGEN SATURATION: 95 % | BODY MASS INDEX: 27.3 KG/M2 | WEIGHT: 206 LBS | SYSTOLIC BLOOD PRESSURE: 147 MMHG

## 2019-03-09 DIAGNOSIS — I83.92 VARICOSE VEINS OF LEFT LOWER EXTREMITY, UNSPECIFIED WHETHER COMPLICATED: Primary | ICD-10-CM

## 2019-03-09 DIAGNOSIS — S81.802A WOUND OF LEFT LEG, INITIAL ENCOUNTER: ICD-10-CM

## 2019-03-09 PROCEDURE — 25000003 PHARM REV CODE 250: Mod: HCNC,ER | Performed by: EMERGENCY MEDICINE

## 2019-03-09 PROCEDURE — 99283 EMERGENCY DEPT VISIT LOW MDM: CPT | Mod: HCNC,ER

## 2019-03-09 RX ADMIN — BACITRACIN, NEOMYCIN, POLYMYXIN B 1 EACH: 400; 3.5; 5 OINTMENT TOPICAL at 04:03

## 2019-03-09 NOTE — ED PROVIDER NOTES
Encounter Date: 3/9/2019       History     Chief Complaint   Patient presents with    Bleeding/Bruising     EMS report called to pt home for report of bleeding to left inner ankle area, states appears to have varicose veins.  Pt reports previous incident approx 1 year PTA.  Bleeding controlled on pt arrival.         03/09/2019  4:02 PM    Chief Complaint:  Left inner ankle bleeding.      The patient is a 89 y.o. male presenting with left inner ankle bleeding after scratching varicose vein.  This happen only 1 previous occasion and the stitch had to be put in.  Bleeding has stopped on its own prior to arrival.  Patient arrived by EMS.  Patient has no other complaints. Patient denies being on blood thinner are aspirin.        Patient has a past medical history of BPH (benign prostatic hyperplasia), Gynecomastia, Hyperlipidemia, Hypertension, PSVT (paroxysmal supraventricular tachycardia), and Spondylosis.  Patient has a past surgical history that includes Colonoscopy (2009) and Hernia repair (1952).          Review of patient's allergies indicates:   Allergen Reactions    Metoprolol Other (See Comments)     bradycardia    Pcn [penicillins] Rash     Past Medical History:   Diagnosis Date    BPH (benign prostatic hyperplasia)     Gynecomastia     Hyperlipidemia     Hypertension     PSVT (paroxysmal supraventricular tachycardia)     Spondylosis      Past Surgical History:   Procedure Laterality Date    COLONOSCOPY  2009    HERNIA REPAIR  1952    umbilical     Family History   Problem Relation Age of Onset    Cancer Sister     Cancer Brother     Heart disease Brother      Social History     Tobacco Use    Smoking status: Never Smoker    Smokeless tobacco: Never Used   Substance Use Topics    Alcohol use: No    Drug use: No     Review of Systems   Hematological: Bruises/bleeds easily.   All other systems reviewed and are negative.      Physical Exam     Initial Vitals [03/09/19 1554]   BP Pulse Resp Temp  SpO2   (!) 147/66 60 18 99.3 °F (37.4 °C) 97 %      MAP       --         Physical Exam    Nursing note and vitals reviewed.  Constitutional: He appears well-developed and well-nourished.   HENT:   Head: Normocephalic and atraumatic.   Right Ear: External ear normal.   Left Ear: External ear normal.   Nose: Nose normal.   Mouth/Throat: Oropharynx is clear and moist.   Eyes: Conjunctivae and EOM are normal. Pupils are equal, round, and reactive to light.   Neck: Normal range of motion. Neck supple.   Cardiovascular: Normal rate, regular rhythm, normal heart sounds and intact distal pulses.   Pulmonary/Chest: Breath sounds normal.   Abdominal: Soft. Bowel sounds are normal.   Musculoskeletal: Normal range of motion.   Dorsal pedal pulse 2 +bilaterally.   Neurological: He is alert. GCS score is 15. GCS eye subscore is 4. GCS verbal subscore is 5. GCS motor subscore is 6.   Skin: Skin is warm. Capillary refill takes less than 2 seconds.   Small tear over left inner ankle varicose vein.  Bleeding now controlled.         ED Course   Procedures  Labs Reviewed - No data to display       Imaging Results    None                               Clinical Impression:       ICD-10-CM ICD-9-CM   1. Varicose veins of left lower extremity, unspecified whether complicated I83.92 454.9   2. Wound of left leg, initial encounter S81.802A 894.0                                Jerald Muse MD  03/09/19 1028

## 2019-05-08 ENCOUNTER — PES CALL (OUTPATIENT)
Dept: ADMINISTRATIVE | Facility: CLINIC | Age: 84
End: 2019-05-08

## 2019-06-14 DIAGNOSIS — I10 ESSENTIAL HYPERTENSION: ICD-10-CM

## 2019-06-14 RX ORDER — LOSARTAN POTASSIUM AND HYDROCHLOROTHIAZIDE 12.5; 5 MG/1; MG/1
TABLET ORAL
Qty: 90 TABLET | Refills: 3 | Status: SHIPPED | OUTPATIENT
Start: 2019-06-14 | End: 2020-08-03 | Stop reason: SDUPTHER

## 2019-09-17 ENCOUNTER — CLINICAL SUPPORT (OUTPATIENT)
Dept: INTERNAL MEDICINE | Facility: CLINIC | Age: 84
End: 2019-09-17
Payer: MEDICARE

## 2019-09-17 DIAGNOSIS — Z23 FLU VACCINE NEED: Primary | ICD-10-CM

## 2019-09-17 PROCEDURE — G0008 ADMIN INFLUENZA VIRUS VAC: HCPCS | Mod: HCNC,S$GLB,, | Performed by: INTERNAL MEDICINE

## 2019-09-17 PROCEDURE — 90662 FLU VACCINE - HIGH DOSE (65+) PRESERVATIVE FREE IM: ICD-10-PCS | Mod: HCNC,S$GLB,, | Performed by: INTERNAL MEDICINE

## 2019-09-17 PROCEDURE — 90662 IIV NO PRSV INCREASED AG IM: CPT | Mod: HCNC,S$GLB,, | Performed by: INTERNAL MEDICINE

## 2019-09-17 PROCEDURE — G0008 FLU VACCINE - HIGH DOSE (65+) PRESERVATIVE FREE IM: ICD-10-PCS | Mod: HCNC,S$GLB,, | Performed by: INTERNAL MEDICINE

## 2019-09-17 NOTE — PROGRESS NOTES
Patient to clinic for flu shot   Pt tolerated well  No redness or swelling noted to injection site  Injection given in L deltoid

## 2019-10-15 ENCOUNTER — PES CALL (OUTPATIENT)
Dept: ADMINISTRATIVE | Facility: CLINIC | Age: 84
End: 2019-10-15

## 2020-03-22 DIAGNOSIS — N40.0 BENIGN PROSTATIC HYPERPLASIA WITHOUT LOWER URINARY TRACT SYMPTOMS: ICD-10-CM

## 2020-03-23 RX ORDER — FINASTERIDE 5 MG/1
TABLET, FILM COATED ORAL
Qty: 90 TABLET | Refills: 4 | Status: SHIPPED | OUTPATIENT
Start: 2020-03-23 | End: 2021-06-29 | Stop reason: SDUPTHER

## 2020-10-12 DIAGNOSIS — D72.829 LEUKOCYTOSIS, UNSPECIFIED TYPE: ICD-10-CM

## 2020-10-12 DIAGNOSIS — I10 ESSENTIAL HYPERTENSION: Primary | ICD-10-CM

## 2020-10-15 ENCOUNTER — LAB VISIT (OUTPATIENT)
Dept: LAB | Facility: HOSPITAL | Age: 85
End: 2020-10-15
Attending: INTERNAL MEDICINE
Payer: MEDICARE

## 2020-10-15 DIAGNOSIS — I10 ESSENTIAL HYPERTENSION: ICD-10-CM

## 2020-10-15 DIAGNOSIS — D72.829 LEUKOCYTOSIS, UNSPECIFIED TYPE: ICD-10-CM

## 2020-10-15 LAB
ALBUMIN SERPL BCP-MCNC: 4 G/DL (ref 3.5–5.2)
ALP SERPL-CCNC: 82 U/L (ref 38–126)
ALT SERPL W/O P-5'-P-CCNC: 11 U/L (ref 10–44)
ANION GAP SERPL CALC-SCNC: 7 MMOL/L (ref 8–16)
AST SERPL-CCNC: 22 U/L (ref 15–46)
BASOPHILS # BLD AUTO: 0.05 K/UL (ref 0–0.2)
BASOPHILS NFR BLD: 0.7 % (ref 0–1.9)
BILIRUB SERPL-MCNC: 1.3 MG/DL (ref 0.1–1)
BUN SERPL-MCNC: 17 MG/DL (ref 2–20)
CALCIUM SERPL-MCNC: 9.3 MG/DL (ref 8.7–10.5)
CHLORIDE SERPL-SCNC: 103 MMOL/L (ref 95–110)
CO2 SERPL-SCNC: 31 MMOL/L (ref 23–29)
CREAT SERPL-MCNC: 0.76 MG/DL (ref 0.5–1.4)
DIFFERENTIAL METHOD: ABNORMAL
EOSINOPHIL # BLD AUTO: 0.5 K/UL (ref 0–0.5)
EOSINOPHIL NFR BLD: 8 % (ref 0–8)
ERYTHROCYTE [DISTWIDTH] IN BLOOD BY AUTOMATED COUNT: 13.6 % (ref 11.5–14.5)
EST. GFR  (AFRICAN AMERICAN): >60 ML/MIN/1.73 M^2
EST. GFR  (NON AFRICAN AMERICAN): >60 ML/MIN/1.73 M^2
GLUCOSE SERPL-MCNC: 106 MG/DL (ref 70–110)
HCT VFR BLD AUTO: 40.8 % (ref 40–54)
HGB BLD-MCNC: 13.4 G/DL (ref 14–18)
IMM GRANULOCYTES # BLD AUTO: 0.01 K/UL (ref 0–0.04)
IMM GRANULOCYTES NFR BLD AUTO: 0.1 % (ref 0–0.5)
LYMPHOCYTES # BLD AUTO: 2.1 K/UL (ref 1–4.8)
LYMPHOCYTES NFR BLD: 31.7 % (ref 18–48)
MCH RBC QN AUTO: 32.2 PG (ref 27–31)
MCHC RBC AUTO-ENTMCNC: 32.8 G/DL (ref 32–36)
MCV RBC AUTO: 98 FL (ref 82–98)
MONOCYTES # BLD AUTO: 0.6 K/UL (ref 0.3–1)
MONOCYTES NFR BLD: 8.8 % (ref 4–15)
NEUTROPHILS # BLD AUTO: 3.4 K/UL (ref 1.8–7.7)
NEUTROPHILS NFR BLD: 50.7 % (ref 38–73)
NRBC BLD-RTO: 0 /100 WBC
PLATELET # BLD AUTO: 159 K/UL (ref 150–350)
PMV BLD AUTO: 12.3 FL (ref 9.2–12.9)
POTASSIUM SERPL-SCNC: 3.8 MMOL/L (ref 3.5–5.1)
PROT SERPL-MCNC: 6.4 G/DL (ref 6–8.4)
RBC # BLD AUTO: 4.16 M/UL (ref 4.6–6.2)
SODIUM SERPL-SCNC: 141 MMOL/L (ref 136–145)
WBC # BLD AUTO: 6.72 K/UL (ref 3.9–12.7)

## 2020-10-15 PROCEDURE — 80053 COMPREHEN METABOLIC PANEL: CPT | Mod: HCNC,PO

## 2020-10-15 PROCEDURE — 36415 COLL VENOUS BLD VENIPUNCTURE: CPT | Mod: HCNC,PO

## 2020-10-15 PROCEDURE — 85025 COMPLETE CBC W/AUTO DIFF WBC: CPT | Mod: HCNC,PO

## 2020-10-19 ENCOUNTER — OFFICE VISIT (OUTPATIENT)
Dept: INTERNAL MEDICINE | Facility: CLINIC | Age: 85
End: 2020-10-19
Payer: MEDICARE

## 2020-10-19 VITALS
OXYGEN SATURATION: 97 % | SYSTOLIC BLOOD PRESSURE: 138 MMHG | WEIGHT: 201.06 LBS | HEART RATE: 89 BPM | BODY MASS INDEX: 28.15 KG/M2 | HEIGHT: 71 IN | DIASTOLIC BLOOD PRESSURE: 74 MMHG

## 2020-10-19 DIAGNOSIS — I10 ESSENTIAL HYPERTENSION: ICD-10-CM

## 2020-10-19 DIAGNOSIS — L30.9 ECZEMA, UNSPECIFIED TYPE: ICD-10-CM

## 2020-10-19 DIAGNOSIS — N40.0 BENIGN PROSTATIC HYPERPLASIA WITHOUT LOWER URINARY TRACT SYMPTOMS: ICD-10-CM

## 2020-10-19 DIAGNOSIS — D69.2 SENILE PURPURA: ICD-10-CM

## 2020-10-19 DIAGNOSIS — Z00.00 ROUTINE GENERAL MEDICAL EXAMINATION AT A HEALTH CARE FACILITY: Primary | ICD-10-CM

## 2020-10-19 PROCEDURE — 1101F PR PT FALLS ASSESS DOC 0-1 FALLS W/OUT INJ PAST YR: ICD-10-PCS | Mod: HCNC,CPTII,S$GLB, | Performed by: INTERNAL MEDICINE

## 2020-10-19 PROCEDURE — 1159F PR MEDICATION LIST DOCUMENTED IN MEDICAL RECORD: ICD-10-PCS | Mod: HCNC,S$GLB,, | Performed by: INTERNAL MEDICINE

## 2020-10-19 PROCEDURE — 99215 PR OFFICE/OUTPT VISIT, EST, LEVL V, 40-54 MIN: ICD-10-PCS | Mod: HCNC,25,S$GLB, | Performed by: INTERNAL MEDICINE

## 2020-10-19 PROCEDURE — 90653 IIV ADJUVANT VACCINE IM: CPT | Mod: HCNC,S$GLB,, | Performed by: INTERNAL MEDICINE

## 2020-10-19 PROCEDURE — 99499 UNLISTED E&M SERVICE: CPT | Mod: S$GLB,,, | Performed by: INTERNAL MEDICINE

## 2020-10-19 PROCEDURE — 1126F PR PAIN SEVERITY QUANTIFIED, NO PAIN PRESENT: ICD-10-PCS | Mod: HCNC,S$GLB,, | Performed by: INTERNAL MEDICINE

## 2020-10-19 PROCEDURE — G0008 PR ADMIN INFLUENZA VIRUS VAC: ICD-10-PCS | Mod: HCNC,S$GLB,, | Performed by: INTERNAL MEDICINE

## 2020-10-19 PROCEDURE — 1126F AMNT PAIN NOTED NONE PRSNT: CPT | Mod: HCNC,S$GLB,, | Performed by: INTERNAL MEDICINE

## 2020-10-19 PROCEDURE — 1159F MED LIST DOCD IN RCRD: CPT | Mod: HCNC,S$GLB,, | Performed by: INTERNAL MEDICINE

## 2020-10-19 PROCEDURE — 99499 RISK ADDL DX/OHS AUDIT: ICD-10-PCS | Mod: S$GLB,,, | Performed by: INTERNAL MEDICINE

## 2020-10-19 PROCEDURE — 99999 PR PBB SHADOW E&M-EST. PATIENT-LVL III: ICD-10-PCS | Mod: PBBFAC,HCNC,, | Performed by: INTERNAL MEDICINE

## 2020-10-19 PROCEDURE — 99215 OFFICE O/P EST HI 40 MIN: CPT | Mod: HCNC,25,S$GLB, | Performed by: INTERNAL MEDICINE

## 2020-10-19 PROCEDURE — G0008 ADMIN INFLUENZA VIRUS VAC: HCPCS | Mod: HCNC,S$GLB,, | Performed by: INTERNAL MEDICINE

## 2020-10-19 PROCEDURE — 1101F PT FALLS ASSESS-DOCD LE1/YR: CPT | Mod: HCNC,CPTII,S$GLB, | Performed by: INTERNAL MEDICINE

## 2020-10-19 PROCEDURE — 90653 FLU VACCINE - ADJUVANTED: ICD-10-PCS | Mod: HCNC,S$GLB,, | Performed by: INTERNAL MEDICINE

## 2020-10-19 PROCEDURE — 99999 PR PBB SHADOW E&M-EST. PATIENT-LVL III: CPT | Mod: PBBFAC,HCNC,, | Performed by: INTERNAL MEDICINE

## 2020-10-19 RX ORDER — TRIAMCINOLONE ACETONIDE 1 MG/G
PASTE DENTAL 2 TIMES DAILY
COMMUNITY
End: 2021-06-29 | Stop reason: SDUPTHER

## 2020-10-19 RX ORDER — CLOBETASOL PROPIONATE 0.5 MG/G
OINTMENT TOPICAL 2 TIMES DAILY
Qty: 60 G | Refills: 3 | Status: SHIPPED | OUTPATIENT
Start: 2020-10-19 | End: 2022-05-24 | Stop reason: SDUPTHER

## 2020-10-19 NOTE — PROGRESS NOTES
Subjective:       Patient ID: Gera Moreau is a 89 y.o. male.    Chief Complaint: Follow-up    [facility]  Wellness check.  Chart reviewed.  No cP, SOB.  Nocturia x 2.  No recent hematuria.  C/O fatigue.  Review of Systems   All other systems reviewed and are negative.      Objective:      Physical Exam  Vitals signs reviewed.   Constitutional:       General: He is not in acute distress.     Appearance: He is well-developed.   HENT:      Head: Normocephalic.      Mouth/Throat:      Mouth: Mucous membranes are moist.   Eyes:      General: No scleral icterus.     Extraocular Movements: Extraocular movements intact.      Conjunctiva/sclera: Conjunctivae normal.   Neck:      Musculoskeletal: Normal range of motion.   Cardiovascular:      Rate and Rhythm: Normal rate and regular rhythm.      Pulses: Normal pulses.      Heart sounds: Normal heart sounds.   Pulmonary:      Effort: Pulmonary effort is normal.      Breath sounds: Normal breath sounds.   Abdominal:      General: Abdomen is flat. There is no distension.      Palpations: Abdomen is soft. There is no mass.   Musculoskeletal: Normal range of motion.      Right lower leg: No edema.      Left lower leg: No edema.   Skin:     General: Skin is warm and dry.   Neurological:      General: No focal deficit present.      Mental Status: He is alert.   Psychiatric:         Mood and Affect: Mood normal.         Assessment:       1. Routine general medical examination at a health care facility    2. Essential hypertension    3. Benign prostatic hyperplasia without lower urinary tract symptoms    4. Senile purpura    5. Eczema, unspecified type        Plan:       Gera was seen today for follow-up.    Diagnoses and all orders for this visit:    Routine general medical examination at a health care facility  -     Influenza - Trivalent (Adjuvanted)    Essential hypertension   Well-cont    Benign prostatic hyperplasia without lower urinary tract symptoms   Cont rx    Senile  purpura   Monitor    Eczema, unspecified type  -     clobetasol 0.05% (TEMOVATE) 0.05 % Oint; Apply topically 2 (two) times daily. for 10 days      No follow-ups on file.

## 2021-06-29 ENCOUNTER — OFFICE VISIT (OUTPATIENT)
Dept: FAMILY MEDICINE | Facility: CLINIC | Age: 86
End: 2021-06-29
Payer: MEDICARE

## 2021-06-29 VITALS
SYSTOLIC BLOOD PRESSURE: 112 MMHG | WEIGHT: 198.63 LBS | HEIGHT: 71 IN | BODY MASS INDEX: 27.81 KG/M2 | TEMPERATURE: 98 F | OXYGEN SATURATION: 95 % | DIASTOLIC BLOOD PRESSURE: 60 MMHG | HEART RATE: 94 BPM

## 2021-06-29 DIAGNOSIS — R63.5 ABNORMAL WEIGHT GAIN: ICD-10-CM

## 2021-06-29 DIAGNOSIS — E55.9 VITAMIN D DEFICIENCY: ICD-10-CM

## 2021-06-29 DIAGNOSIS — N40.0 BENIGN PROSTATIC HYPERPLASIA WITHOUT LOWER URINARY TRACT SYMPTOMS: ICD-10-CM

## 2021-06-29 DIAGNOSIS — R29.898 WEAKNESS OF LOWER EXTREMITY, UNSPECIFIED LATERALITY: Primary | ICD-10-CM

## 2021-06-29 DIAGNOSIS — E78.5 HYPERLIPIDEMIA, UNSPECIFIED HYPERLIPIDEMIA TYPE: ICD-10-CM

## 2021-06-29 PROCEDURE — 3288F FALL RISK ASSESSMENT DOCD: CPT | Mod: CPTII,S$GLB,, | Performed by: FAMILY MEDICINE

## 2021-06-29 PROCEDURE — 3288F PR FALLS RISK ASSESSMENT DOCUMENTED: ICD-10-PCS | Mod: CPTII,S$GLB,, | Performed by: FAMILY MEDICINE

## 2021-06-29 PROCEDURE — 1101F PT FALLS ASSESS-DOCD LE1/YR: CPT | Mod: CPTII,S$GLB,, | Performed by: FAMILY MEDICINE

## 2021-06-29 PROCEDURE — 99499 UNLISTED E&M SERVICE: CPT | Mod: S$GLB,,, | Performed by: FAMILY MEDICINE

## 2021-06-29 PROCEDURE — 99214 OFFICE O/P EST MOD 30 MIN: CPT | Mod: S$GLB,,, | Performed by: FAMILY MEDICINE

## 2021-06-29 PROCEDURE — 1126F PR PAIN SEVERITY QUANTIFIED, NO PAIN PRESENT: ICD-10-PCS | Mod: S$GLB,,, | Performed by: FAMILY MEDICINE

## 2021-06-29 PROCEDURE — 1159F MED LIST DOCD IN RCRD: CPT | Mod: S$GLB,,, | Performed by: FAMILY MEDICINE

## 2021-06-29 PROCEDURE — 1101F PR PT FALLS ASSESS DOC 0-1 FALLS W/OUT INJ PAST YR: ICD-10-PCS | Mod: CPTII,S$GLB,, | Performed by: FAMILY MEDICINE

## 2021-06-29 PROCEDURE — 99214 PR OFFICE/OUTPT VISIT, EST, LEVL IV, 30-39 MIN: ICD-10-PCS | Mod: S$GLB,,, | Performed by: FAMILY MEDICINE

## 2021-06-29 PROCEDURE — 1126F AMNT PAIN NOTED NONE PRSNT: CPT | Mod: S$GLB,,, | Performed by: FAMILY MEDICINE

## 2021-06-29 PROCEDURE — 99499 RISK ADDL DX/OHS AUDIT: ICD-10-PCS | Mod: S$GLB,,, | Performed by: FAMILY MEDICINE

## 2021-06-29 PROCEDURE — 1159F PR MEDICATION LIST DOCUMENTED IN MEDICAL RECORD: ICD-10-PCS | Mod: S$GLB,,, | Performed by: FAMILY MEDICINE

## 2021-06-29 RX ORDER — TRIAMCINOLONE ACETONIDE 1 MG/G
PASTE DENTAL 2 TIMES DAILY
Qty: 5 G | Refills: 3 | Status: SHIPPED | OUTPATIENT
Start: 2021-06-29 | End: 2022-05-24 | Stop reason: SDUPTHER

## 2021-06-29 RX ORDER — FINASTERIDE 5 MG/1
TABLET, FILM COATED ORAL
Qty: 90 TABLET | Refills: 4 | Status: SHIPPED | OUTPATIENT
Start: 2021-06-29 | End: 2022-05-24 | Stop reason: SDUPTHER

## 2021-07-28 ENCOUNTER — PES CALL (OUTPATIENT)
Dept: ADMINISTRATIVE | Facility: CLINIC | Age: 86
End: 2021-07-28

## 2021-09-28 ENCOUNTER — TELEPHONE (OUTPATIENT)
Dept: FAMILY MEDICINE | Facility: CLINIC | Age: 86
End: 2021-09-28

## 2021-11-18 ENCOUNTER — LAB VISIT (OUTPATIENT)
Dept: LAB | Facility: HOSPITAL | Age: 86
End: 2021-11-18
Attending: FAMILY MEDICINE
Payer: MEDICARE

## 2021-11-18 DIAGNOSIS — E78.5 HYPERLIPIDEMIA, UNSPECIFIED HYPERLIPIDEMIA TYPE: ICD-10-CM

## 2021-11-18 DIAGNOSIS — E55.9 VITAMIN D DEFICIENCY: ICD-10-CM

## 2021-11-18 DIAGNOSIS — R63.5 ABNORMAL WEIGHT GAIN: ICD-10-CM

## 2021-11-18 DIAGNOSIS — R29.898 WEAKNESS OF LOWER EXTREMITY, UNSPECIFIED LATERALITY: ICD-10-CM

## 2021-11-18 LAB
25(OH)D3+25(OH)D2 SERPL-MCNC: 30 NG/ML (ref 30–96)
ALBUMIN SERPL BCP-MCNC: 3.7 G/DL (ref 3.5–5.2)
ALP SERPL-CCNC: 90 U/L (ref 38–126)
ALT SERPL W/O P-5'-P-CCNC: 11 U/L (ref 10–44)
ANION GAP SERPL CALC-SCNC: 7 MMOL/L (ref 8–16)
AST SERPL-CCNC: 19 U/L (ref 15–46)
BASOPHILS # BLD AUTO: 0.05 K/UL (ref 0–0.2)
BASOPHILS NFR BLD: 0.6 % (ref 0–1.9)
BILIRUB SERPL-MCNC: 1 MG/DL (ref 0.1–1)
CALCIUM SERPL-MCNC: 8.8 MG/DL (ref 8.7–10.5)
CHLORIDE SERPL-SCNC: 104 MMOL/L (ref 95–110)
CHOLEST SERPL-MCNC: 151 MG/DL (ref 120–199)
CHOLEST/HDLC SERPL: 3.8 {RATIO} (ref 2–5)
CO2 SERPL-SCNC: 31 MMOL/L (ref 23–29)
CREAT SERPL-MCNC: 0.75 MG/DL (ref 0.5–1.4)
DIFFERENTIAL METHOD: ABNORMAL
EOSINOPHIL # BLD AUTO: 0.4 K/UL (ref 0–0.5)
EOSINOPHIL NFR BLD: 4.4 % (ref 0–8)
ERYTHROCYTE [DISTWIDTH] IN BLOOD BY AUTOMATED COUNT: 13.4 % (ref 11.5–14.5)
EST. GFR  (AFRICAN AMERICAN): >60 ML/MIN/1.73 M^2
EST. GFR  (NON AFRICAN AMERICAN): >60 ML/MIN/1.73 M^2
GLUCOSE SERPL-MCNC: 103 MG/DL (ref 70–110)
HCT VFR BLD AUTO: 39.8 % (ref 40–54)
HDLC SERPL-MCNC: 40 MG/DL (ref 40–75)
HDLC SERPL: 26.5 % (ref 20–50)
HGB BLD-MCNC: 13.1 G/DL (ref 14–18)
IMM GRANULOCYTES # BLD AUTO: 0.01 K/UL (ref 0–0.04)
IMM GRANULOCYTES NFR BLD AUTO: 0.1 % (ref 0–0.5)
LDLC SERPL CALC-MCNC: 94.4 MG/DL (ref 63–159)
LYMPHOCYTES # BLD AUTO: 2.6 K/UL (ref 1–4.8)
LYMPHOCYTES NFR BLD: 32.3 % (ref 18–48)
MCH RBC QN AUTO: 32.7 PG (ref 27–31)
MCHC RBC AUTO-ENTMCNC: 32.9 G/DL (ref 32–36)
MCV RBC AUTO: 99 FL (ref 82–98)
MONOCYTES # BLD AUTO: 0.7 K/UL (ref 0.3–1)
MONOCYTES NFR BLD: 8.1 % (ref 4–15)
NEUTROPHILS # BLD AUTO: 4.4 K/UL (ref 1.8–7.7)
NEUTROPHILS NFR BLD: 54.5 % (ref 38–73)
NONHDLC SERPL-MCNC: 111 MG/DL
NRBC BLD-RTO: 0 /100 WBC
PLATELET # BLD AUTO: 149 K/UL (ref 150–450)
PMV BLD AUTO: 12 FL (ref 9.2–12.9)
POTASSIUM SERPL-SCNC: 4.1 MMOL/L (ref 3.5–5.1)
PROT SERPL-MCNC: 5.9 G/DL (ref 6–8.4)
RBC # BLD AUTO: 4.01 M/UL (ref 4.6–6.2)
SODIUM SERPL-SCNC: 142 MMOL/L (ref 136–145)
TRIGL SERPL-MCNC: 83 MG/DL (ref 30–150)
TSH SERPL DL<=0.005 MIU/L-ACNC: 3.41 UIU/ML (ref 0.4–4)
UUN UR-MCNC: 15 MG/DL (ref 2–20)
WBC # BLD AUTO: 7.99 K/UL (ref 3.9–12.7)

## 2021-11-18 PROCEDURE — 85025 COMPLETE CBC W/AUTO DIFF WBC: CPT | Mod: HCNC,PO | Performed by: FAMILY MEDICINE

## 2021-11-18 PROCEDURE — 80061 LIPID PANEL: CPT | Mod: HCNC | Performed by: FAMILY MEDICINE

## 2021-11-18 PROCEDURE — 84443 ASSAY THYROID STIM HORMONE: CPT | Mod: HCNC,PO | Performed by: FAMILY MEDICINE

## 2021-11-18 PROCEDURE — 82306 VITAMIN D 25 HYDROXY: CPT | Mod: HCNC,PO | Performed by: FAMILY MEDICINE

## 2021-11-18 PROCEDURE — 80053 COMPREHEN METABOLIC PANEL: CPT | Mod: HCNC,PO | Performed by: FAMILY MEDICINE

## 2021-11-18 PROCEDURE — 36415 COLL VENOUS BLD VENIPUNCTURE: CPT | Mod: HCNC,PO | Performed by: FAMILY MEDICINE

## 2021-11-23 ENCOUNTER — OFFICE VISIT (OUTPATIENT)
Dept: FAMILY MEDICINE | Facility: CLINIC | Age: 86
End: 2021-11-23
Payer: MEDICARE

## 2021-11-23 VITALS
HEART RATE: 99 BPM | HEIGHT: 71 IN | WEIGHT: 196.63 LBS | OXYGEN SATURATION: 100 % | BODY MASS INDEX: 27.53 KG/M2 | DIASTOLIC BLOOD PRESSURE: 60 MMHG | SYSTOLIC BLOOD PRESSURE: 118 MMHG | TEMPERATURE: 98 F

## 2021-11-23 DIAGNOSIS — E78.5 HYPERLIPIDEMIA, UNSPECIFIED HYPERLIPIDEMIA TYPE: ICD-10-CM

## 2021-11-23 DIAGNOSIS — D64.9 ANEMIA, UNSPECIFIED TYPE: ICD-10-CM

## 2021-11-23 DIAGNOSIS — B07.9 VIRAL WARTS, UNSPECIFIED TYPE: ICD-10-CM

## 2021-11-23 DIAGNOSIS — I10 ESSENTIAL HYPERTENSION: Primary | ICD-10-CM

## 2021-11-23 DIAGNOSIS — D53.9 MACROCYTIC ANEMIA: ICD-10-CM

## 2021-11-23 DIAGNOSIS — E55.9 VITAMIN D DEFICIENCY: ICD-10-CM

## 2021-11-23 DIAGNOSIS — N40.0 BENIGN PROSTATIC HYPERPLASIA WITHOUT LOWER URINARY TRACT SYMPTOMS: ICD-10-CM

## 2021-11-23 PROCEDURE — 99214 PR OFFICE/OUTPT VISIT, EST, LEVL IV, 30-39 MIN: ICD-10-PCS | Mod: S$GLB,,, | Performed by: FAMILY MEDICINE

## 2021-11-23 PROCEDURE — 99214 OFFICE O/P EST MOD 30 MIN: CPT | Mod: S$GLB,,, | Performed by: FAMILY MEDICINE

## 2021-11-23 PROCEDURE — 99499 RISK ADDL DX/OHS AUDIT: ICD-10-PCS | Mod: S$GLB,,, | Performed by: FAMILY MEDICINE

## 2021-11-23 PROCEDURE — 99499 UNLISTED E&M SERVICE: CPT | Mod: S$GLB,,, | Performed by: FAMILY MEDICINE

## 2022-04-18 ENCOUNTER — PES CALL (OUTPATIENT)
Dept: ADMINISTRATIVE | Facility: CLINIC | Age: 87
End: 2022-04-18
Payer: MEDICARE

## 2022-05-24 ENCOUNTER — OFFICE VISIT (OUTPATIENT)
Dept: FAMILY MEDICINE | Facility: CLINIC | Age: 87
End: 2022-05-24
Payer: MEDICARE

## 2022-05-24 VITALS
SYSTOLIC BLOOD PRESSURE: 126 MMHG | WEIGHT: 197.75 LBS | TEMPERATURE: 99 F | DIASTOLIC BLOOD PRESSURE: 78 MMHG | HEIGHT: 71 IN | BODY MASS INDEX: 27.69 KG/M2 | HEART RATE: 82 BPM | OXYGEN SATURATION: 96 %

## 2022-05-24 DIAGNOSIS — L30.9 ECZEMA, UNSPECIFIED TYPE: ICD-10-CM

## 2022-05-24 DIAGNOSIS — N40.0 BENIGN PROSTATIC HYPERPLASIA WITHOUT LOWER URINARY TRACT SYMPTOMS: ICD-10-CM

## 2022-05-24 DIAGNOSIS — D53.9 MACROCYTIC ANEMIA: ICD-10-CM

## 2022-05-24 DIAGNOSIS — I10 ESSENTIAL HYPERTENSION: Primary | ICD-10-CM

## 2022-05-24 DIAGNOSIS — B07.9 VIRAL WARTS, UNSPECIFIED TYPE: ICD-10-CM

## 2022-05-24 PROCEDURE — 1159F PR MEDICATION LIST DOCUMENTED IN MEDICAL RECORD: ICD-10-PCS | Mod: CPTII,S$GLB,, | Performed by: STUDENT IN AN ORGANIZED HEALTH CARE EDUCATION/TRAINING PROGRAM

## 2022-05-24 PROCEDURE — 99214 PR OFFICE/OUTPT VISIT, EST, LEVL IV, 30-39 MIN: ICD-10-PCS | Mod: 25,S$GLB,, | Performed by: STUDENT IN AN ORGANIZED HEALTH CARE EDUCATION/TRAINING PROGRAM

## 2022-05-24 PROCEDURE — 3288F FALL RISK ASSESSMENT DOCD: CPT | Mod: CPTII,S$GLB,, | Performed by: STUDENT IN AN ORGANIZED HEALTH CARE EDUCATION/TRAINING PROGRAM

## 2022-05-24 PROCEDURE — 99499 RISK ADDL DX/OHS AUDIT: ICD-10-PCS | Mod: S$GLB,,, | Performed by: STUDENT IN AN ORGANIZED HEALTH CARE EDUCATION/TRAINING PROGRAM

## 2022-05-24 PROCEDURE — 1126F PR PAIN SEVERITY QUANTIFIED, NO PAIN PRESENT: ICD-10-PCS | Mod: CPTII,S$GLB,, | Performed by: STUDENT IN AN ORGANIZED HEALTH CARE EDUCATION/TRAINING PROGRAM

## 2022-05-24 PROCEDURE — 1160F RVW MEDS BY RX/DR IN RCRD: CPT | Mod: CPTII,S$GLB,, | Performed by: STUDENT IN AN ORGANIZED HEALTH CARE EDUCATION/TRAINING PROGRAM

## 2022-05-24 PROCEDURE — 1101F PR PT FALLS ASSESS DOC 0-1 FALLS W/OUT INJ PAST YR: ICD-10-PCS | Mod: CPTII,S$GLB,, | Performed by: STUDENT IN AN ORGANIZED HEALTH CARE EDUCATION/TRAINING PROGRAM

## 2022-05-24 PROCEDURE — 1101F PT FALLS ASSESS-DOCD LE1/YR: CPT | Mod: CPTII,S$GLB,, | Performed by: STUDENT IN AN ORGANIZED HEALTH CARE EDUCATION/TRAINING PROGRAM

## 2022-05-24 PROCEDURE — 1126F AMNT PAIN NOTED NONE PRSNT: CPT | Mod: CPTII,S$GLB,, | Performed by: STUDENT IN AN ORGANIZED HEALTH CARE EDUCATION/TRAINING PROGRAM

## 2022-05-24 PROCEDURE — 99499 UNLISTED E&M SERVICE: CPT | Mod: S$GLB,,, | Performed by: STUDENT IN AN ORGANIZED HEALTH CARE EDUCATION/TRAINING PROGRAM

## 2022-05-24 PROCEDURE — 99214 OFFICE O/P EST MOD 30 MIN: CPT | Mod: 25,S$GLB,, | Performed by: STUDENT IN AN ORGANIZED HEALTH CARE EDUCATION/TRAINING PROGRAM

## 2022-05-24 PROCEDURE — 3288F PR FALLS RISK ASSESSMENT DOCUMENTED: ICD-10-PCS | Mod: CPTII,S$GLB,, | Performed by: STUDENT IN AN ORGANIZED HEALTH CARE EDUCATION/TRAINING PROGRAM

## 2022-05-24 PROCEDURE — 1160F PR REVIEW ALL MEDS BY PRESCRIBER/CLIN PHARMACIST DOCUMENTED: ICD-10-PCS | Mod: CPTII,S$GLB,, | Performed by: STUDENT IN AN ORGANIZED HEALTH CARE EDUCATION/TRAINING PROGRAM

## 2022-05-24 PROCEDURE — 17110 DESTRUCTION, BENIGN LESION: ICD-10-PCS | Mod: S$GLB,,, | Performed by: STUDENT IN AN ORGANIZED HEALTH CARE EDUCATION/TRAINING PROGRAM

## 2022-05-24 PROCEDURE — 17110 DESTRUCTION B9 LES UP TO 14: CPT | Mod: S$GLB,,, | Performed by: STUDENT IN AN ORGANIZED HEALTH CARE EDUCATION/TRAINING PROGRAM

## 2022-05-24 PROCEDURE — 1159F MED LIST DOCD IN RCRD: CPT | Mod: CPTII,S$GLB,, | Performed by: STUDENT IN AN ORGANIZED HEALTH CARE EDUCATION/TRAINING PROGRAM

## 2022-05-24 RX ORDER — FINASTERIDE 5 MG/1
TABLET, FILM COATED ORAL
Qty: 90 TABLET | Refills: 4 | Status: SHIPPED | OUTPATIENT
Start: 2022-05-24 | End: 2023-05-15 | Stop reason: SDUPTHER

## 2022-05-24 RX ORDER — TRIAMCINOLONE ACETONIDE 1 MG/G
PASTE DENTAL 2 TIMES DAILY
Qty: 5 G | Refills: 3 | Status: SHIPPED | OUTPATIENT
Start: 2022-05-24

## 2022-05-24 RX ORDER — LOSARTAN POTASSIUM AND HYDROCHLOROTHIAZIDE 12.5; 5 MG/1; MG/1
1 TABLET ORAL DAILY
Qty: 90 TABLET | Refills: 3 | Status: SHIPPED | OUTPATIENT
Start: 2022-05-24 | End: 2023-05-15 | Stop reason: SDUPTHER

## 2022-05-24 RX ORDER — CLOBETASOL PROPIONATE 0.5 MG/G
OINTMENT TOPICAL 2 TIMES DAILY
Qty: 60 G | Refills: 3 | Status: SHIPPED | OUTPATIENT
Start: 2022-05-24 | End: 2023-05-15

## 2022-05-24 NOTE — PROCEDURES
Destruction, Benign Lesion    Date/Time: 5/24/2022 11:20 AM  Performed by: Jeferson Hugo MD  Authorized by: Jeferson Hugo MD     Consent Done?:  Yes (Verbal)  Pre-Procedure:     Timeout: prior to procedure the correct patient, procedure, and site was verified      Local anesthesia used?: No    Indications:     other  Location:     Upper Extremity:  Arm    Detail:  Left upper arm  Prep:     Preparation: Patient was prepped and draped in usual sterile fashion    Procedure Details:     Cosmetic?: No      Number of lesions:  1    Destruction method:  Cryotherapy     Patient tolerated the procedure well with no immediate complications.

## 2022-05-24 NOTE — PROGRESS NOTES
" Patient ID: Gera Moreau is a 89 y.o. male.     Chief Complaint: Follow-up (6 month )    HPI   patient here accompanied by his wife. He complains of a wart on the back side of his left arm. He was supposed to have this removed during his last visit but there was no liquid nitrogen available. The wart is causing him pain and is getting stuck on his shirt. He has been apply a cream that he has at home to the area, but this has not healed the wart.     Review of Systems  Review of Systems   Constitutional: Negative for fever.   HENT: Negative for ear pain and sinus pain.    Eyes: Negative for discharge.   Respiratory: Negative for cough and shortness of breath.    Cardiovascular: Negative for chest pain and leg swelling.   Gastrointestinal: Negative for diarrhea, nausea and vomiting.   Genitourinary: Negative for urgency.   Musculoskeletal: Negative for myalgias.   Skin: Negative for rash.   Neurological: Negative for weakness and headaches.   Psychiatric/Behavioral: Negative for depression.   All other systems reviewed and are negative.      Currently Medications  Current Outpatient Medications on File Prior to Visit   Medication Sig Dispense Refill    [DISCONTINUED] finasteride (PROSCAR) 5 mg tablet TAKE 1 TABLET EVERY DAY 90 tablet 4    [DISCONTINUED] losartan-hydrochlorothiazide 50-12.5 mg (HYZAAR) 50-12.5 mg per tablet TAKE 1 TABLET BY MOUTH EVERY DAY 90 tablet 3    [DISCONTINUED] triamcinolone acetonide 0.1% (KENALOG) 0.1 % paste Place onto teeth 2 (two) times daily. 5 g 3    [DISCONTINUED] clobetasol 0.05% (TEMOVATE) 0.05 % Oint Apply topically 2 (two) times daily. for 10 days 60 g 3     No current facility-administered medications on file prior to visit.       Physical  Exam  Vitals:    05/24/22 1044   BP: 126/78   BP Location: Right arm   Patient Position: Sitting   Pulse: 82   Temp: 98.6 °F (37 °C)   SpO2: 96%   Weight: 89.7 kg (197 lb 12 oz)   Height: 5' 11" (1.803 m)      Body mass index is " 27.58 kg/m².    Physical Exam  Vitals and nursing note reviewed.   Constitutional:       General: He is not in acute distress.     Appearance: He is not ill-appearing.   HENT:      Head: Normocephalic and atraumatic.      Right Ear: External ear normal.      Left Ear: External ear normal.      Nose: Nose normal.      Mouth/Throat:      Mouth: Mucous membranes are moist.   Eyes:      Extraocular Movements: Extraocular movements intact.      Conjunctiva/sclera: Conjunctivae normal.   Cardiovascular:      Rate and Rhythm: Normal rate and regular rhythm.      Pulses: Normal pulses.      Heart sounds: No murmur heard.  Pulmonary:      Effort: Pulmonary effort is normal. No respiratory distress.      Breath sounds: No wheezing.   Abdominal:      General: There is no distension.      Palpations: Abdomen is soft. There is no mass.      Tenderness: There is no abdominal tenderness.   Musculoskeletal:         General: No swelling.      Cervical back: Normal range of motion.   Skin:     Coloration: Skin is not jaundiced.      Findings: No rash.             Comments: 0.5 round hyperkeratotic lesion   Neurological:      General: No focal deficit present.      Mental Status: He is alert and oriented to person, place, and time.   Psychiatric:         Mood and Affect: Mood normal.         Thought Content: Thought content normal.         Labs:    Complete Blood Count  Lab Results   Component Value Date    RBC 4.01 (L) 11/18/2021    HGB 13.1 (L) 11/18/2021    HCT 39.8 (L) 11/18/2021    MCV 99 (H) 11/18/2021    MCH 32.7 (H) 11/18/2021    MCHC 32.9 11/18/2021    RDW 13.4 11/18/2021     (L) 11/18/2021    MPV 12.0 11/18/2021    GRAN 4.4 11/18/2021    GRAN 54.5 11/18/2021    LYMPH 2.6 11/18/2021    LYMPH 32.3 11/18/2021    MONO 0.7 11/18/2021    MONO 8.1 11/18/2021    EOS 0.4 11/18/2021    BASO 0.05 11/18/2021    EOSINOPHIL 4.4 11/18/2021    BASOPHIL 0.6 11/18/2021    DIFFMETHOD Automated 11/18/2021       Comprehensive Metabolic  Panel  No results found for: GLU, BUN, CREATININE, NA, K, CL, PROT, ALBUMIN, BILITOT, AST, ALKPHOS, CO2, ALT, ANIONGAP, EGFRNONAA, ESTGFRAFRICA    TSH  No results found for: TSH    Imaging:  X-Ray Ankle Complete 3 View Right  Narrative: 3 view x-ray of the right ankle    Clinical History:     Cellulitis of right lower limb    Findings:     There is no fracture. There is no dislocation.  There is mild prominence of the soft tissue thickness lateral to the right ankle.  There is no radiographic evidence of osteomyelitis.  Impression:      There is mild prominence of the soft tissue thickness lateral to the right ankle.  This is consistent with the patient's history and characteristic of cellulitis.  There is no radiographic evidence of osteomyelitis.  ______________________________________     Electronically signed by: JANEL FLORES MD  Date:     06/13/16  Time:    15:41       Assessment/Plan:    Problem List Items Addressed This Visit        Cardiac/Vascular    Essential hypertension - Primary    Relevant Medications    losartan-hydrochlorothiazide 50-12.5 mg (HYZAAR) 50-12.5 mg per tablet       Renal/    BPH (benign prostatic hyperplasia)    Relevant Medications    finasteride (PROSCAR) 5 mg tablet      Other Visit Diagnoses     Eczema, unspecified type        Relevant Medications    clobetasol 0.05% (TEMOVATE) 0.05 % Oint    Macrocytic anemia        Relevant Orders    Vitamin B12    Folate    CBC Auto Differential    Comprehensive metabolic panel    Viral warts, unspecified type        Relevant Orders    Destruction, Benign Lesion (Completed)           Discussed how to stay healthy including: diet, exercise, refraining from smoking and discussed screening exams / tests needed for age, sex and family Hx.    RTC pending labs    Jeferson Hugo MD

## 2022-05-25 NOTE — PROGRESS NOTES
Patient, Gera Moreau (MRN #[id number]), presented with a recent Platelet count less than 150 K/uL consistent with the definition of thrombocytopenia (ICD10 - D69.6).    Platelets   Date Value Ref Range Status   11/18/2021 149 (L) 150 - 450 K/uL Final     The patient's thrombocytopenia was monitored, evaluated, addressed and/or treated. This addendum to the medical record is made on 05/25/2022.

## 2022-05-31 ENCOUNTER — LAB VISIT (OUTPATIENT)
Dept: LAB | Facility: HOSPITAL | Age: 87
End: 2022-05-31
Attending: STUDENT IN AN ORGANIZED HEALTH CARE EDUCATION/TRAINING PROGRAM
Payer: MEDICARE

## 2022-05-31 DIAGNOSIS — D53.9 MACROCYTIC ANEMIA: ICD-10-CM

## 2022-05-31 LAB
ALBUMIN SERPL BCP-MCNC: 4.2 G/DL (ref 3.5–5.2)
ALP SERPL-CCNC: 80 U/L (ref 38–126)
ALT SERPL W/O P-5'-P-CCNC: 16 U/L (ref 10–44)
ANION GAP SERPL CALC-SCNC: 9 MMOL/L (ref 8–16)
AST SERPL-CCNC: 22 U/L (ref 15–46)
BASOPHILS # BLD AUTO: 0.06 K/UL (ref 0–0.2)
BASOPHILS NFR BLD: 0.9 % (ref 0–1.9)
BILIRUB SERPL-MCNC: 1.3 MG/DL (ref 0.1–1)
CALCIUM SERPL-MCNC: 9.3 MG/DL (ref 8.7–10.5)
CHLORIDE SERPL-SCNC: 102 MMOL/L (ref 95–110)
CO2 SERPL-SCNC: 29 MMOL/L (ref 23–29)
CREAT SERPL-MCNC: 0.71 MG/DL (ref 0.5–1.4)
DIFFERENTIAL METHOD: ABNORMAL
EOSINOPHIL # BLD AUTO: 0.4 K/UL (ref 0–0.5)
EOSINOPHIL NFR BLD: 5.4 % (ref 0–8)
ERYTHROCYTE [DISTWIDTH] IN BLOOD BY AUTOMATED COUNT: 13.7 % (ref 11.5–14.5)
EST. GFR  (AFRICAN AMERICAN): >60 ML/MIN/1.73 M^2
EST. GFR  (NON AFRICAN AMERICAN): >60 ML/MIN/1.73 M^2
FOLATE SERPL-MCNC: 9.3 NG/ML (ref 4–24)
GLUCOSE SERPL-MCNC: 153 MG/DL (ref 70–110)
HCT VFR BLD AUTO: 42.6 % (ref 40–54)
HGB BLD-MCNC: 13.8 G/DL (ref 14–18)
IMM GRANULOCYTES # BLD AUTO: 0.02 K/UL (ref 0–0.04)
IMM GRANULOCYTES NFR BLD AUTO: 0.3 % (ref 0–0.5)
LYMPHOCYTES # BLD AUTO: 2.9 K/UL (ref 1–4.8)
LYMPHOCYTES NFR BLD: 42.4 % (ref 18–48)
MCH RBC QN AUTO: 32 PG (ref 27–31)
MCHC RBC AUTO-ENTMCNC: 32.4 G/DL (ref 32–36)
MCV RBC AUTO: 99 FL (ref 82–98)
MONOCYTES # BLD AUTO: 0.6 K/UL (ref 0.3–1)
MONOCYTES NFR BLD: 8.3 % (ref 4–15)
NEUTROPHILS # BLD AUTO: 3 K/UL (ref 1.8–7.7)
NEUTROPHILS NFR BLD: 42.7 % (ref 38–73)
NRBC BLD-RTO: 0 /100 WBC
PLATELET # BLD AUTO: 148 K/UL (ref 150–450)
PMV BLD AUTO: 12.6 FL (ref 9.2–12.9)
POTASSIUM SERPL-SCNC: 5.1 MMOL/L (ref 3.5–5.1)
PROT SERPL-MCNC: 6.2 G/DL (ref 6–8.4)
RBC # BLD AUTO: 4.31 M/UL (ref 4.6–6.2)
SODIUM SERPL-SCNC: 140 MMOL/L (ref 136–145)
UUN UR-MCNC: 15 MG/DL (ref 2–20)
VIT B12 SERPL-MCNC: 348 PG/ML (ref 210–950)
WBC # BLD AUTO: 6.89 K/UL (ref 3.9–12.7)

## 2022-05-31 PROCEDURE — 85025 COMPLETE CBC W/AUTO DIFF WBC: CPT | Mod: PO | Performed by: STUDENT IN AN ORGANIZED HEALTH CARE EDUCATION/TRAINING PROGRAM

## 2022-05-31 PROCEDURE — 82746 ASSAY OF FOLIC ACID SERUM: CPT | Mod: PO | Performed by: STUDENT IN AN ORGANIZED HEALTH CARE EDUCATION/TRAINING PROGRAM

## 2022-05-31 PROCEDURE — 80053 COMPREHEN METABOLIC PANEL: CPT | Mod: PO | Performed by: STUDENT IN AN ORGANIZED HEALTH CARE EDUCATION/TRAINING PROGRAM

## 2022-05-31 PROCEDURE — 36415 COLL VENOUS BLD VENIPUNCTURE: CPT | Mod: PO | Performed by: STUDENT IN AN ORGANIZED HEALTH CARE EDUCATION/TRAINING PROGRAM

## 2022-05-31 PROCEDURE — 82607 VITAMIN B-12: CPT | Mod: PO | Performed by: STUDENT IN AN ORGANIZED HEALTH CARE EDUCATION/TRAINING PROGRAM

## 2022-06-06 NOTE — PROGRESS NOTES
Your bloodwork looks fine and does not require any change in treatment.     Please contact me if you have any additional concerns.    Sincerely,    Jeferson Hugo

## 2022-12-15 ENCOUNTER — PES CALL (OUTPATIENT)
Dept: ADMINISTRATIVE | Facility: CLINIC | Age: 87
End: 2022-12-15
Payer: MEDICARE

## 2023-02-07 DIAGNOSIS — Z00.00 ENCOUNTER FOR MEDICARE ANNUAL WELLNESS EXAM: ICD-10-CM

## 2023-02-08 DIAGNOSIS — I10 ESSENTIAL HYPERTENSION: Primary | ICD-10-CM

## 2023-02-08 DIAGNOSIS — E78.00 HYPERCHOLESTEREMIA: ICD-10-CM

## 2023-02-09 DIAGNOSIS — Z00.00 ENCOUNTER FOR MEDICARE ANNUAL WELLNESS EXAM: ICD-10-CM

## 2023-05-08 ENCOUNTER — LAB VISIT (OUTPATIENT)
Dept: LAB | Facility: HOSPITAL | Age: 88
End: 2023-05-08
Attending: STUDENT IN AN ORGANIZED HEALTH CARE EDUCATION/TRAINING PROGRAM
Payer: MEDICARE

## 2023-05-08 DIAGNOSIS — I10 ESSENTIAL HYPERTENSION: ICD-10-CM

## 2023-05-08 DIAGNOSIS — E78.00 HYPERCHOLESTEREMIA: ICD-10-CM

## 2023-05-08 LAB
ALBUMIN SERPL BCP-MCNC: 3.9 G/DL (ref 3.5–5.2)
ALP SERPL-CCNC: 80 U/L (ref 38–126)
ALT SERPL W/O P-5'-P-CCNC: 17 U/L (ref 10–44)
ANION GAP SERPL CALC-SCNC: 7 MMOL/L (ref 8–16)
AST SERPL-CCNC: 26 U/L (ref 15–46)
BASOPHILS # BLD AUTO: 0.05 K/UL (ref 0–0.2)
BASOPHILS NFR BLD: 0.7 % (ref 0–1.9)
BILIRUB SERPL-MCNC: 1.2 MG/DL (ref 0.1–1)
CALCIUM SERPL-MCNC: 9 MG/DL (ref 8.7–10.5)
CHLORIDE SERPL-SCNC: 106 MMOL/L (ref 95–110)
CHOLEST SERPL-MCNC: 174 MG/DL (ref 120–199)
CHOLEST/HDLC SERPL: 3.9 {RATIO} (ref 2–5)
CO2 SERPL-SCNC: 29 MMOL/L (ref 23–29)
CREAT SERPL-MCNC: 0.94 MG/DL (ref 0.5–1.4)
DIFFERENTIAL METHOD: ABNORMAL
EOSINOPHIL # BLD AUTO: 0.4 K/UL (ref 0–0.5)
EOSINOPHIL NFR BLD: 5.7 % (ref 0–8)
ERYTHROCYTE [DISTWIDTH] IN BLOOD BY AUTOMATED COUNT: 13.5 % (ref 11.5–14.5)
EST. GFR  (NO RACE VARIABLE): >60 ML/MIN/1.73 M^2
GLUCOSE SERPL-MCNC: 108 MG/DL (ref 70–110)
HCT VFR BLD AUTO: 43.1 % (ref 40–54)
HDLC SERPL-MCNC: 45 MG/DL (ref 40–75)
HDLC SERPL: 25.9 % (ref 20–50)
HGB BLD-MCNC: 14.1 G/DL (ref 14–18)
IMM GRANULOCYTES # BLD AUTO: 0.01 K/UL (ref 0–0.04)
IMM GRANULOCYTES NFR BLD AUTO: 0.1 % (ref 0–0.5)
LDLC SERPL CALC-MCNC: 109.6 MG/DL (ref 63–159)
LYMPHOCYTES # BLD AUTO: 2.8 K/UL (ref 1–4.8)
LYMPHOCYTES NFR BLD: 41.5 % (ref 18–48)
MCH RBC QN AUTO: 31.9 PG (ref 27–31)
MCHC RBC AUTO-ENTMCNC: 32.7 G/DL (ref 32–36)
MCV RBC AUTO: 98 FL (ref 82–98)
MONOCYTES # BLD AUTO: 0.5 K/UL (ref 0.3–1)
MONOCYTES NFR BLD: 7.3 % (ref 4–15)
NEUTROPHILS # BLD AUTO: 3 K/UL (ref 1.8–7.7)
NEUTROPHILS NFR BLD: 44.7 % (ref 38–73)
NONHDLC SERPL-MCNC: 129 MG/DL
NRBC BLD-RTO: 0 /100 WBC
PLATELET # BLD AUTO: 164 K/UL (ref 150–450)
PMV BLD AUTO: 12.3 FL (ref 9.2–12.9)
POTASSIUM SERPL-SCNC: 4.2 MMOL/L (ref 3.5–5.1)
PROT SERPL-MCNC: 6.2 G/DL (ref 6–8.4)
RBC # BLD AUTO: 4.42 M/UL (ref 4.6–6.2)
SODIUM SERPL-SCNC: 142 MMOL/L (ref 136–145)
TRIGL SERPL-MCNC: 97 MG/DL (ref 30–150)
TSH SERPL DL<=0.005 MIU/L-ACNC: 2.71 UIU/ML (ref 0.4–4)
UUN UR-MCNC: 17 MG/DL (ref 2–20)
WBC # BLD AUTO: 6.67 K/UL (ref 3.9–12.7)

## 2023-05-08 PROCEDURE — 36415 COLL VENOUS BLD VENIPUNCTURE: CPT | Mod: PO | Performed by: STUDENT IN AN ORGANIZED HEALTH CARE EDUCATION/TRAINING PROGRAM

## 2023-05-08 PROCEDURE — 80061 LIPID PANEL: CPT | Performed by: STUDENT IN AN ORGANIZED HEALTH CARE EDUCATION/TRAINING PROGRAM

## 2023-05-08 PROCEDURE — 84443 ASSAY THYROID STIM HORMONE: CPT | Mod: PO | Performed by: STUDENT IN AN ORGANIZED HEALTH CARE EDUCATION/TRAINING PROGRAM

## 2023-05-08 PROCEDURE — 85025 COMPLETE CBC W/AUTO DIFF WBC: CPT | Mod: PO | Performed by: STUDENT IN AN ORGANIZED HEALTH CARE EDUCATION/TRAINING PROGRAM

## 2023-05-08 PROCEDURE — 80053 COMPREHEN METABOLIC PANEL: CPT | Mod: PO | Performed by: STUDENT IN AN ORGANIZED HEALTH CARE EDUCATION/TRAINING PROGRAM

## 2023-05-10 ENCOUNTER — TELEPHONE (OUTPATIENT)
Dept: FAMILY MEDICINE | Facility: CLINIC | Age: 88
End: 2023-05-10
Payer: MEDICARE

## 2023-05-15 ENCOUNTER — PATIENT MESSAGE (OUTPATIENT)
Dept: FAMILY MEDICINE | Facility: CLINIC | Age: 88
End: 2023-05-15

## 2023-05-15 ENCOUNTER — OFFICE VISIT (OUTPATIENT)
Dept: FAMILY MEDICINE | Facility: CLINIC | Age: 88
End: 2023-05-15
Payer: MEDICARE

## 2023-05-15 VITALS
DIASTOLIC BLOOD PRESSURE: 72 MMHG | OXYGEN SATURATION: 95 % | BODY MASS INDEX: 25.99 KG/M2 | SYSTOLIC BLOOD PRESSURE: 130 MMHG | HEART RATE: 76 BPM | WEIGHT: 185.63 LBS | HEIGHT: 71 IN

## 2023-05-15 DIAGNOSIS — N40.0 BENIGN PROSTATIC HYPERPLASIA WITHOUT LOWER URINARY TRACT SYMPTOMS: ICD-10-CM

## 2023-05-15 DIAGNOSIS — I10 ESSENTIAL HYPERTENSION: ICD-10-CM

## 2023-05-15 PROBLEM — D69.2 SENILE PURPURA: Status: RESOLVED | Noted: 2018-05-16 | Resolved: 2023-05-15

## 2023-05-15 PROCEDURE — 3288F PR FALLS RISK ASSESSMENT DOCUMENTED: ICD-10-PCS | Mod: CPTII,S$GLB,, | Performed by: STUDENT IN AN ORGANIZED HEALTH CARE EDUCATION/TRAINING PROGRAM

## 2023-05-15 PROCEDURE — 1126F PR PAIN SEVERITY QUANTIFIED, NO PAIN PRESENT: ICD-10-PCS | Mod: CPTII,S$GLB,, | Performed by: STUDENT IN AN ORGANIZED HEALTH CARE EDUCATION/TRAINING PROGRAM

## 2023-05-15 PROCEDURE — 1101F PT FALLS ASSESS-DOCD LE1/YR: CPT | Mod: CPTII,S$GLB,, | Performed by: STUDENT IN AN ORGANIZED HEALTH CARE EDUCATION/TRAINING PROGRAM

## 2023-05-15 PROCEDURE — 1160F RVW MEDS BY RX/DR IN RCRD: CPT | Mod: CPTII,S$GLB,, | Performed by: STUDENT IN AN ORGANIZED HEALTH CARE EDUCATION/TRAINING PROGRAM

## 2023-05-15 PROCEDURE — 1160F PR REVIEW ALL MEDS BY PRESCRIBER/CLIN PHARMACIST DOCUMENTED: ICD-10-PCS | Mod: CPTII,S$GLB,, | Performed by: STUDENT IN AN ORGANIZED HEALTH CARE EDUCATION/TRAINING PROGRAM

## 2023-05-15 PROCEDURE — 99397 PR PREVENTIVE VISIT,EST,65 & OVER: ICD-10-PCS | Mod: S$GLB,,, | Performed by: STUDENT IN AN ORGANIZED HEALTH CARE EDUCATION/TRAINING PROGRAM

## 2023-05-15 PROCEDURE — 1159F MED LIST DOCD IN RCRD: CPT | Mod: CPTII,S$GLB,, | Performed by: STUDENT IN AN ORGANIZED HEALTH CARE EDUCATION/TRAINING PROGRAM

## 2023-05-15 PROCEDURE — 99397 PER PM REEVAL EST PAT 65+ YR: CPT | Mod: S$GLB,,, | Performed by: STUDENT IN AN ORGANIZED HEALTH CARE EDUCATION/TRAINING PROGRAM

## 2023-05-15 PROCEDURE — 3288F FALL RISK ASSESSMENT DOCD: CPT | Mod: CPTII,S$GLB,, | Performed by: STUDENT IN AN ORGANIZED HEALTH CARE EDUCATION/TRAINING PROGRAM

## 2023-05-15 PROCEDURE — 1101F PR PT FALLS ASSESS DOC 0-1 FALLS W/OUT INJ PAST YR: ICD-10-PCS | Mod: CPTII,S$GLB,, | Performed by: STUDENT IN AN ORGANIZED HEALTH CARE EDUCATION/TRAINING PROGRAM

## 2023-05-15 PROCEDURE — 1126F AMNT PAIN NOTED NONE PRSNT: CPT | Mod: CPTII,S$GLB,, | Performed by: STUDENT IN AN ORGANIZED HEALTH CARE EDUCATION/TRAINING PROGRAM

## 2023-05-15 PROCEDURE — 1159F PR MEDICATION LIST DOCUMENTED IN MEDICAL RECORD: ICD-10-PCS | Mod: CPTII,S$GLB,, | Performed by: STUDENT IN AN ORGANIZED HEALTH CARE EDUCATION/TRAINING PROGRAM

## 2023-05-15 RX ORDER — FINASTERIDE 5 MG/1
TABLET, FILM COATED ORAL
Qty: 90 TABLET | Refills: 4 | Status: SHIPPED | OUTPATIENT
Start: 2023-05-15

## 2023-05-15 RX ORDER — LOSARTAN POTASSIUM AND HYDROCHLOROTHIAZIDE 12.5; 5 MG/1; MG/1
1 TABLET ORAL DAILY
Qty: 90 TABLET | Refills: 3 | Status: SHIPPED | OUTPATIENT
Start: 2023-05-15

## 2023-05-15 NOTE — PROGRESS NOTES
" Patient ID: Gera Moreau is a 89 y.o. male.     Chief Complaint: Annual Exam    HPI   Patient here for wellness exam. He is feeling well. He has no complaints today.  He denies chest pain and shortness of breath. He is taking all medications as prescribed. He works in his yard and garden daily.     Review of Systems  Review of Systems   Constitutional:  Negative for fever.   HENT:  Negative for ear pain and sinus pain.    Eyes:  Negative for discharge.   Respiratory:  Negative for cough and shortness of breath.    Cardiovascular:  Negative for chest pain and leg swelling.   Gastrointestinal:  Negative for diarrhea, nausea and vomiting.   Genitourinary:  Negative for urgency.   Musculoskeletal:  Negative for myalgias.   Skin:  Negative for rash.   Neurological:  Negative for weakness and headaches.   Psychiatric/Behavioral:  Negative for depression.    All other systems reviewed and are negative.    Currently Medications  Current Outpatient Medications on File Prior to Visit   Medication Sig Dispense Refill    clobetasol 0.05% (TEMOVATE) 0.05 % Oint Apply topically 2 (two) times daily. for 10 days 60 g 3    triamcinolone acetonide 0.1% (KENALOG) 0.1 % paste Place onto teeth 2 (two) times daily. 5 g 3    [DISCONTINUED] finasteride (PROSCAR) 5 mg tablet TAKE 1 TABLET EVERY DAY 90 tablet 4    [DISCONTINUED] losartan-hydrochlorothiazide 50-12.5 mg (HYZAAR) 50-12.5 mg per tablet Take 1 tablet by mouth once daily. 90 tablet 3     No current facility-administered medications on file prior to visit.       Physical  Exam  Vitals:    05/15/23 1049   BP: 130/72   Pulse: 76   SpO2: 95%   Weight: 84.2 kg (185 lb 10 oz)   Height: 5' 11" (1.803 m)      Body mass index is 25.89 kg/m².  Wt Readings from Last 3 Encounters:   05/15/23 84.2 kg (185 lb 10 oz)   05/24/22 89.7 kg (197 lb 12 oz)   11/23/21 89.2 kg (196 lb 10.4 oz)       Physical Exam  Vitals and nursing note reviewed.   Constitutional:       General: He is not in " acute distress.     Appearance: He is not ill-appearing.   HENT:      Head: Normocephalic and atraumatic.      Right Ear: External ear normal.      Left Ear: External ear normal.      Nose: Nose normal.      Mouth/Throat:      Mouth: Mucous membranes are moist.   Eyes:      Extraocular Movements: Extraocular movements intact.      Conjunctiva/sclera: Conjunctivae normal.   Cardiovascular:      Rate and Rhythm: Normal rate and regular rhythm.      Pulses: Normal pulses.      Heart sounds: No murmur heard.  Pulmonary:      Effort: Pulmonary effort is normal. No respiratory distress.      Breath sounds: No wheezing.   Abdominal:      General: There is no distension.      Palpations: Abdomen is soft. There is no mass.      Tenderness: There is no abdominal tenderness.   Musculoskeletal:         General: No swelling.      Cervical back: Normal range of motion.   Skin:     Coloration: Skin is not jaundiced.      Findings: No rash.   Neurological:      General: No focal deficit present.      Mental Status: He is alert and oriented to person, place, and time.   Psychiatric:         Mood and Affect: Mood normal.         Thought Content: Thought content normal.       Labs:    Complete Blood Count  Lab Results   Component Value Date    RBC 4.42 (L) 05/08/2023    HGB 14.1 05/08/2023    HCT 43.1 05/08/2023    MCV 98 05/08/2023    MCH 31.9 (H) 05/08/2023    MCHC 32.7 05/08/2023    RDW 13.5 05/08/2023     05/08/2023    MPV 12.3 05/08/2023    GRAN 3.0 05/08/2023    GRAN 44.7 05/08/2023    LYMPH 2.8 05/08/2023    LYMPH 41.5 05/08/2023    MONO 0.5 05/08/2023    MONO 7.3 05/08/2023    EOS 0.4 05/08/2023    BASO 0.05 05/08/2023    EOSINOPHIL 5.7 05/08/2023    BASOPHIL 0.7 05/08/2023    DIFFMETHOD Automated 05/08/2023       Comprehensive Metabolic Panel  Lab Results   Component Value Date     05/08/2023    BUN 17 05/08/2023    CREATININE 0.94 05/08/2023     05/08/2023    K 4.2 05/08/2023     05/08/2023    PROT  6.2 05/08/2023    ALBUMIN 3.9 05/08/2023    BILITOT 1.2 (H) 05/08/2023    AST 26 05/08/2023    ALKPHOS 80 05/08/2023    CO2 29 05/08/2023    ALT 17 05/08/2023    ANIONGAP 7 (L) 05/08/2023       TSH  Lab Results   Component Value Date    TSH 2.710 05/08/2023       Imaging:  X-Ray Ankle Complete 3 View Right  Narrative: 3 view x-ray of the right ankle    Clinical History:     Cellulitis of right lower limb    Findings:     There is no fracture. There is no dislocation.  There is mild prominence of the soft tissue thickness lateral to the right ankle.  There is no radiographic evidence of osteomyelitis.  Impression:      There is mild prominence of the soft tissue thickness lateral to the right ankle.  This is consistent with the patient's history and characteristic of cellulitis.  There is no radiographic evidence of osteomyelitis.  ______________________________________     Electronically signed by: JANEL FLORES MD  Date:     06/13/16  Time:    15:41       Assessment/Plan:    1. Benign prostatic hyperplasia without lower urinary tract symptoms  -     finasteride (PROSCAR) 5 mg tablet; TAKE 1 TABLET EVERY DAY  Dispense: 90 tablet; Refill: 4    2. Essential hypertension  -     losartan-hydrochlorothiazide 50-12.5 mg (HYZAAR) 50-12.5 mg per tablet; Take 1 tablet by mouth once daily.  Dispense: 90 tablet; Refill: 3         Discussed how to stay healthy including: diet, exercise, refraining from smoking and discussed screening exams / tests needed for age, sex and family Hx.      RTC 1 year    Jeferson Hugo MD

## 2023-08-11 ENCOUNTER — PES CALL (OUTPATIENT)
Dept: ADMINISTRATIVE | Facility: CLINIC | Age: 88
End: 2023-08-11
Payer: MEDICARE

## 2024-03-21 DIAGNOSIS — R73.9 HYPERGLYCEMIA: ICD-10-CM

## 2024-03-21 DIAGNOSIS — E78.00 HYPERCHOLESTEREMIA: ICD-10-CM

## 2024-03-21 DIAGNOSIS — I10 ESSENTIAL HYPERTENSION: Primary | ICD-10-CM

## 2024-04-23 ENCOUNTER — TELEPHONE (OUTPATIENT)
Dept: FAMILY MEDICINE | Facility: CLINIC | Age: 89
End: 2024-04-23
Payer: MEDICARE

## 2024-04-23 DIAGNOSIS — F51.01 PRIMARY INSOMNIA: Primary | ICD-10-CM

## 2024-04-23 RX ORDER — TEMAZEPAM 7.5 MG/1
7.5 CAPSULE ORAL NIGHTLY PRN
Qty: 60 CAPSULE | Refills: 1 | Status: SHIPPED | OUTPATIENT
Start: 2024-04-23

## 2024-04-23 NOTE — TELEPHONE ENCOUNTER
----- Message from Walteradolfo James sent at 4/23/2024 12:53 PM CDT -----  Contact: PT's daughter  Type: Requesting to speak with nurse        Who Called: PT's daughter   Regarding: requesting a low dose of xanax to help him sleep at night due to wife passing. He is not sleeping well.   Would the patient rather a call back or a response via [facility]? Call back  Best Call Back Number:  [phone]

## 2024-05-08 ENCOUNTER — LAB VISIT (OUTPATIENT)
Dept: LAB | Facility: HOSPITAL | Age: 89
End: 2024-05-08
Attending: STUDENT IN AN ORGANIZED HEALTH CARE EDUCATION/TRAINING PROGRAM
Payer: MEDICARE

## 2024-05-08 DIAGNOSIS — I10 ESSENTIAL HYPERTENSION: ICD-10-CM

## 2024-05-08 DIAGNOSIS — E78.00 HYPERCHOLESTEREMIA: ICD-10-CM

## 2024-05-08 DIAGNOSIS — R73.9 HYPERGLYCEMIA: ICD-10-CM

## 2024-05-08 LAB
ALBUMIN SERPL BCP-MCNC: 3.9 G/DL (ref 3.5–5.2)
ALP SERPL-CCNC: 87 U/L (ref 38–126)
ALT SERPL W/O P-5'-P-CCNC: 14 U/L (ref 10–44)
ANION GAP SERPL CALC-SCNC: 7 MMOL/L (ref 8–16)
AST SERPL-CCNC: 25 U/L (ref 15–46)
BASOPHILS # BLD AUTO: 0.03 K/UL (ref 0–0.2)
BASOPHILS NFR BLD: 0.4 % (ref 0–1.9)
BILIRUB SERPL-MCNC: 1.2 MG/DL (ref 0.1–1)
CALCIUM SERPL-MCNC: 9.4 MG/DL (ref 8.7–10.5)
CHLORIDE SERPL-SCNC: 104 MMOL/L (ref 95–110)
CHOLEST SERPL-MCNC: 160 MG/DL (ref 120–199)
CHOLEST/HDLC SERPL: 3.3 {RATIO} (ref 2–5)
CO2 SERPL-SCNC: 29 MMOL/L (ref 23–29)
CREAT SERPL-MCNC: 0.74 MG/DL (ref 0.5–1.4)
DIFFERENTIAL METHOD BLD: ABNORMAL
EOSINOPHIL # BLD AUTO: 0.4 K/UL (ref 0–0.5)
EOSINOPHIL NFR BLD: 5.2 % (ref 0–8)
ERYTHROCYTE [DISTWIDTH] IN BLOOD BY AUTOMATED COUNT: 13.5 % (ref 11.5–14.5)
EST. GFR  (NO RACE VARIABLE): >60 ML/MIN/1.73 M^2
ESTIMATED AVG GLUCOSE: 117 MG/DL (ref 68–131)
GLUCOSE SERPL-MCNC: 110 MG/DL (ref 70–110)
HBA1C MFR BLD: 5.7 % (ref 4–5.6)
HCT VFR BLD AUTO: 41.8 % (ref 40–54)
HDLC SERPL-MCNC: 48 MG/DL (ref 40–75)
HDLC SERPL: 30 % (ref 20–50)
HGB BLD-MCNC: 14.1 G/DL (ref 14–18)
IMM GRANULOCYTES # BLD AUTO: 0.02 K/UL (ref 0–0.04)
IMM GRANULOCYTES NFR BLD AUTO: 0.3 % (ref 0–0.5)
LDLC SERPL CALC-MCNC: 96.6 MG/DL (ref 63–159)
LYMPHOCYTES # BLD AUTO: 2.8 K/UL (ref 1–4.8)
LYMPHOCYTES NFR BLD: 40.9 % (ref 18–48)
MCH RBC QN AUTO: 32.7 PG (ref 27–31)
MCHC RBC AUTO-ENTMCNC: 33.7 G/DL (ref 32–36)
MCV RBC AUTO: 97 FL (ref 82–98)
MONOCYTES # BLD AUTO: 0.6 K/UL (ref 0.3–1)
MONOCYTES NFR BLD: 8.3 % (ref 4–15)
NEUTROPHILS # BLD AUTO: 3 K/UL (ref 1.8–7.7)
NEUTROPHILS NFR BLD: 44.9 % (ref 38–73)
NONHDLC SERPL-MCNC: 112 MG/DL
NRBC BLD-RTO: 0 /100 WBC
PLATELET # BLD AUTO: 145 K/UL (ref 150–450)
PMV BLD AUTO: 12.1 FL (ref 9.2–12.9)
POTASSIUM SERPL-SCNC: 4.9 MMOL/L (ref 3.5–5.1)
PROT SERPL-MCNC: 6.3 G/DL (ref 6–8.4)
RBC # BLD AUTO: 4.31 M/UL (ref 4.6–6.2)
SODIUM SERPL-SCNC: 140 MMOL/L (ref 136–145)
TRIGL SERPL-MCNC: 77 MG/DL (ref 30–150)
TSH SERPL DL<=0.005 MIU/L-ACNC: 2.37 UIU/ML (ref 0.4–4)
UUN UR-MCNC: 15 MG/DL (ref 2–20)
WBC # BLD AUTO: 6.77 K/UL (ref 3.9–12.7)

## 2024-05-08 PROCEDURE — 36415 COLL VENOUS BLD VENIPUNCTURE: CPT | Mod: HCNC,PN | Performed by: STUDENT IN AN ORGANIZED HEALTH CARE EDUCATION/TRAINING PROGRAM

## 2024-05-08 PROCEDURE — 83036 HEMOGLOBIN GLYCOSYLATED A1C: CPT | Mod: HCNC | Performed by: STUDENT IN AN ORGANIZED HEALTH CARE EDUCATION/TRAINING PROGRAM

## 2024-05-08 PROCEDURE — 84443 ASSAY THYROID STIM HORMONE: CPT | Mod: HCNC,PN | Performed by: STUDENT IN AN ORGANIZED HEALTH CARE EDUCATION/TRAINING PROGRAM

## 2024-05-08 PROCEDURE — 80053 COMPREHEN METABOLIC PANEL: CPT | Mod: HCNC,PN | Performed by: STUDENT IN AN ORGANIZED HEALTH CARE EDUCATION/TRAINING PROGRAM

## 2024-05-08 PROCEDURE — 85025 COMPLETE CBC W/AUTO DIFF WBC: CPT | Mod: HCNC,PN | Performed by: STUDENT IN AN ORGANIZED HEALTH CARE EDUCATION/TRAINING PROGRAM

## 2024-05-08 PROCEDURE — 80061 LIPID PANEL: CPT | Mod: HCNC | Performed by: STUDENT IN AN ORGANIZED HEALTH CARE EDUCATION/TRAINING PROGRAM

## 2024-05-15 ENCOUNTER — OFFICE VISIT (OUTPATIENT)
Dept: FAMILY MEDICINE | Facility: CLINIC | Age: 89
End: 2024-05-15
Payer: MEDICARE

## 2024-05-15 VITALS
SYSTOLIC BLOOD PRESSURE: 114 MMHG | OXYGEN SATURATION: 95 % | WEIGHT: 192.44 LBS | BODY MASS INDEX: 26.94 KG/M2 | DIASTOLIC BLOOD PRESSURE: 70 MMHG | HEART RATE: 96 BPM | HEIGHT: 71 IN | TEMPERATURE: 98 F

## 2024-05-15 DIAGNOSIS — Z00.00 WELLNESS EXAMINATION: Primary | ICD-10-CM

## 2024-05-15 DIAGNOSIS — I10 ESSENTIAL HYPERTENSION: ICD-10-CM

## 2024-05-15 DIAGNOSIS — F43.21 GRIEF: ICD-10-CM

## 2024-05-15 DIAGNOSIS — H61.22 IMPACTED CERUMEN OF LEFT EAR: ICD-10-CM

## 2024-05-15 PROCEDURE — 3288F FALL RISK ASSESSMENT DOCD: CPT | Mod: CPTII,S$GLB,, | Performed by: STUDENT IN AN ORGANIZED HEALTH CARE EDUCATION/TRAINING PROGRAM

## 2024-05-15 PROCEDURE — 99397 PER PM REEVAL EST PAT 65+ YR: CPT | Mod: 25,S$GLB,, | Performed by: STUDENT IN AN ORGANIZED HEALTH CARE EDUCATION/TRAINING PROGRAM

## 2024-05-15 PROCEDURE — 1160F RVW MEDS BY RX/DR IN RCRD: CPT | Mod: CPTII,S$GLB,, | Performed by: STUDENT IN AN ORGANIZED HEALTH CARE EDUCATION/TRAINING PROGRAM

## 2024-05-15 PROCEDURE — 1159F MED LIST DOCD IN RCRD: CPT | Mod: CPTII,S$GLB,, | Performed by: STUDENT IN AN ORGANIZED HEALTH CARE EDUCATION/TRAINING PROGRAM

## 2024-05-15 PROCEDURE — 69209 REMOVE IMPACTED EAR WAX UNI: CPT | Mod: LT,S$GLB,, | Performed by: STUDENT IN AN ORGANIZED HEALTH CARE EDUCATION/TRAINING PROGRAM

## 2024-05-15 PROCEDURE — 1101F PT FALLS ASSESS-DOCD LE1/YR: CPT | Mod: CPTII,S$GLB,, | Performed by: STUDENT IN AN ORGANIZED HEALTH CARE EDUCATION/TRAINING PROGRAM

## 2024-05-15 PROCEDURE — 1126F AMNT PAIN NOTED NONE PRSNT: CPT | Mod: CPTII,S$GLB,, | Performed by: STUDENT IN AN ORGANIZED HEALTH CARE EDUCATION/TRAINING PROGRAM

## 2024-05-15 RX ORDER — LOSARTAN POTASSIUM AND HYDROCHLOROTHIAZIDE 12.5; 5 MG/1; MG/1
1 TABLET ORAL DAILY
Qty: 90 TABLET | Refills: 3 | Status: SHIPPED | OUTPATIENT
Start: 2024-05-15 | End: 2024-05-15 | Stop reason: ALTCHOICE

## 2024-05-15 RX ORDER — HYDROCHLOROTHIAZIDE 12.5 MG/1
12.5 TABLET ORAL DAILY PRN
Qty: 90 TABLET | Refills: 2 | Status: SHIPPED | OUTPATIENT
Start: 2024-05-15

## 2024-05-15 NOTE — PROGRESS NOTES
Patient ID: Gera Moreau is a 89 y.o. male.     Chief Complaint: Annual Exam    HPI   Patient here for annual exam. He is accompanied by his daughter. He reports intermittent weakness. He has not had any associated symptoms associated with this. He does not check his blood pressure regularly at home. He lives at home alone and completes all of his ADLs. His family checks in on him 3-4 times per day. His wife passed away recently. He is sad but does not feel like he needs depression medicine.     Review of Systems  Review of Systems   Constitutional:  Negative for fever.   HENT:  Negative for ear pain and sinus pain.    Eyes:  Negative for discharge.   Respiratory:  Negative for cough and shortness of breath.    Cardiovascular:  Negative for chest pain and leg swelling.   Gastrointestinal:  Negative for diarrhea, nausea and vomiting.   Genitourinary:  Negative for urgency.   Musculoskeletal:  Negative for myalgias.   Skin:  Negative for rash.   Neurological:  Negative for weakness and headaches.   Psychiatric/Behavioral:  Negative for depression.    All other systems reviewed and are negative.      Currently Medications  Current Outpatient Medications on File Prior to Visit   Medication Sig Dispense Refill    finasteride (PROSCAR) 5 mg tablet TAKE 1 TABLET EVERY DAY 90 tablet 4    temazepam (RESTORIL) 7.5 MG Cap Take 1 capsule (7.5 mg total) by mouth nightly as needed (insomnia). 60 capsule 1    triamcinolone acetonide 0.1% (KENALOG) 0.1 % paste Place onto teeth 2 (two) times daily. 5 g 3    [DISCONTINUED] losartan-hydrochlorothiazide 50-12.5 mg (HYZAAR) 50-12.5 mg per tablet Take 1 tablet by mouth once daily. 90 tablet 3    clobetasol 0.05% (TEMOVATE) 0.05 % Oint Apply topically 2 (two) times daily. for 10 days 60 g 3     No current facility-administered medications on file prior to visit.       Physical  Exam  Vitals:    05/15/24 1047   BP: 114/70   BP Location: Right arm   Patient Position: Sitting  "  Pulse: 96   Temp: 98.2 °F (36.8 °C)   SpO2: 95%   Weight: 87.3 kg (192 lb 7.4 oz)   Height: 5' 11" (1.803 m)      Body mass index is 26.84 kg/m².  Wt Readings from Last 3 Encounters:   05/15/24 87.3 kg (192 lb 7.4 oz)   05/15/23 84.2 kg (185 lb 10 oz)   05/24/22 89.7 kg (197 lb 12 oz)       Physical Exam  Vitals and nursing note reviewed.   Constitutional:       General: He is not in acute distress.     Appearance: He is not ill-appearing.   HENT:      Head: Normocephalic and atraumatic.      Right Ear: External ear normal.      Left Ear: External ear normal. There is impacted cerumen.      Nose: Nose normal.      Mouth/Throat:      Mouth: Mucous membranes are moist.   Eyes:      Extraocular Movements: Extraocular movements intact.      Conjunctiva/sclera: Conjunctivae normal.   Cardiovascular:      Rate and Rhythm: Normal rate and regular rhythm.      Pulses: Normal pulses.      Heart sounds: No murmur heard.  Pulmonary:      Effort: Pulmonary effort is normal. No respiratory distress.      Breath sounds: No wheezing.   Abdominal:      General: There is no distension.      Palpations: Abdomen is soft. There is no mass.      Tenderness: There is no abdominal tenderness.   Musculoskeletal:         General: No swelling.      Cervical back: Normal range of motion.   Skin:     Coloration: Skin is not jaundiced.      Findings: No rash.   Neurological:      General: No focal deficit present.      Mental Status: He is alert and oriented to person, place, and time.   Psychiatric:         Mood and Affect: Mood normal.         Thought Content: Thought content normal.         Labs:    Complete Blood Count  Lab Results   Component Value Date    RBC 4.31 (L) 05/08/2024    HGB 14.1 05/08/2024    HCT 41.8 05/08/2024    MCV 97 05/08/2024    MCH 32.7 (H) 05/08/2024    MCHC 33.7 05/08/2024    RDW 13.5 05/08/2024     (L) 05/08/2024    MPV 12.1 05/08/2024    GRAN 3.0 05/08/2024    GRAN 44.9 05/08/2024    LYMPH 2.8 05/08/2024 "    LYMPH 40.9 05/08/2024    MONO 0.6 05/08/2024    MONO 8.3 05/08/2024    EOS 0.4 05/08/2024    BASO 0.03 05/08/2024    EOSINOPHIL 5.2 05/08/2024    BASOPHIL 0.4 05/08/2024    DIFFMETHOD Automated 05/08/2024       Comprehensive Metabolic Panel  Lab Results   Component Value Date     05/08/2024    BUN 15 05/08/2024    CREATININE 0.74 05/08/2024     05/08/2024    K 4.9 05/08/2024     05/08/2024    PROT 6.3 05/08/2024    ALBUMIN 3.9 05/08/2024    BILITOT 1.2 (H) 05/08/2024    AST 25 05/08/2024    ALKPHOS 87 05/08/2024    CO2 29 05/08/2024    ALT 14 05/08/2024    ANIONGAP 7 (L) 05/08/2024       TSH  Lab Results   Component Value Date    TSH 2.370 05/08/2024       Imaging:  X-Ray Ankle Complete 3 View Right  Narrative: 3 view x-ray of the right ankle    Clinical History:     Cellulitis of right lower limb    Findings:     There is no fracture. There is no dislocation.  There is mild prominence of the soft tissue thickness lateral to the right ankle.  There is no radiographic evidence of osteomyelitis.  Impression:      There is mild prominence of the soft tissue thickness lateral to the right ankle.  This is consistent with the patient's history and characteristic of cellulitis.  There is no radiographic evidence of osteomyelitis.  ______________________________________     Electronically signed by: JANEL FLORES MD  Date:     06/13/16  Time:    15:41       Assessment/Plan:    1. Wellness examination    2. Essential hypertension  -     Discontinue: losartan-hydrochlorothiazide 50-12.5 mg (HYZAAR) 50-12.5 mg per tablet; Take 1 tablet by mouth once daily.  Dispense: 90 tablet; Refill: 3  -     hydroCHLOROthiazide (HYDRODIURIL) 12.5 MG Tab; Take 1 tablet (12.5 mg total) by mouth daily as needed (swelling).  Dispense: 90 tablet; Refill: 2    3. Impacted cerumen of left ear  -     Ear Cerumen Removal    4. Grief     Stop losartan-HCTZ due to questionable hypotension. Restart HCTZ. Monitor blood pressure at  home.     Discussed how to stay healthy including: diet, exercise, refraining from smoking and discussed screening exams / tests needed for age, sex and family Hx.    RTC 1 year or sooner if needed    Jeferson Hugo MD

## 2024-05-15 NOTE — PROCEDURES
"Ear Cerumen Removal    Date/Time: 5/15/2024 10:40 AM    Performed by: Jeferson Hugo MD  Authorized by: Jeferson Hugo MD    Time out: Immediately prior to procedure a "time out" was called to verify the correct patient, procedure, equipment, support staff and site/side marked as required.    Consent Done?:  Yes (Verbal)    Local anesthetic:  None  Ceruminolytics applied: Ceruminolytics applied prior to the procedure    Ceruminolytic: liquid docusate.  Location details:  Left ear  Procedure type: irrigation    Cerumen  Removal Results:  Cerumen completely removed  Patient tolerance:  Patient tolerated the procedure well with no immediate complications    "

## 2024-07-22 DIAGNOSIS — N40.0 BENIGN PROSTATIC HYPERPLASIA WITHOUT LOWER URINARY TRACT SYMPTOMS: ICD-10-CM

## 2024-07-22 RX ORDER — FINASTERIDE 5 MG/1
TABLET, FILM COATED ORAL
Qty: 90 TABLET | Refills: 3 | Status: SHIPPED | OUTPATIENT
Start: 2024-07-22

## 2024-07-22 NOTE — TELEPHONE ENCOUNTER
Refill Decision Note   Gera Davisnissa  is requesting a refill authorization.  Brief Assessment and Rationale for Refill:  Approve     Medication Therapy Plan:         Comments:     Note composed:3:29 AM 07/22/2024

## 2024-07-22 NOTE — TELEPHONE ENCOUNTER
No care due was identified.  [facility] Embedded Care Due Messages. Reference number: [id number].   7/22/2024 2:11:09 AM CDT

## 2024-08-19 ENCOUNTER — TELEPHONE (OUTPATIENT)
Dept: FAMILY MEDICINE | Facility: CLINIC | Age: 89
End: 2024-08-19
Payer: MEDICARE

## 2024-08-19 DIAGNOSIS — F51.01 PRIMARY INSOMNIA: ICD-10-CM

## 2024-08-19 RX ORDER — TEMAZEPAM 7.5 MG/1
7.5 CAPSULE ORAL NIGHTLY PRN
Qty: 90 CAPSULE | Refills: 1 | Status: SHIPPED | OUTPATIENT
Start: 2024-08-19

## 2024-08-19 NOTE — TELEPHONE ENCOUNTER
----- Message from La Schroeder sent at 8/19/2024  1:03 PM CDT -----  Needs advice from nurse:      Who Called:daughterJose  Regarding:pt needs refill on whatever med you gave him to help him sleep, states he is unable to sleep since his wife's death  Would the patient rather a call back or VIA [facility]?  Best Call Back number:[phone]  Additional Info: [facility]/pharmacy #[id number] - [place], [place] - [address] [place] [address] AT [facility] (Ph: [phone])

## 2025-02-18 DIAGNOSIS — F51.01 PRIMARY INSOMNIA: ICD-10-CM

## 2025-02-19 NOTE — TELEPHONE ENCOUNTER
Care Due:                  Date            Visit Type   Department     Provider  --------------------------------------------------------------------------------                                EP -                              PRIMARY      [facility] FAMILY  Last Visit: 05-      CARE ([facility])   MEDICINE       Jeferson Hugo  Next Visit: None Scheduled  None         None Found                                                            Last  Test          Frequency    Reason                     Performed    Due Date  --------------------------------------------------------------------------------    CMP.........  12 months..  hydroCHLOROthiazide......  05- 05-    [facility] Embedded Care Due Messages. Reference number: [id number].   2/18/2025 6:14:57 PM CST

## 2025-02-20 ENCOUNTER — TELEPHONE (OUTPATIENT)
Dept: FAMILY MEDICINE | Facility: CLINIC | Age: OVER 89
End: 2025-02-20
Payer: MEDICARE

## 2025-02-20 DIAGNOSIS — F51.01 PRIMARY INSOMNIA: ICD-10-CM

## 2025-02-20 RX ORDER — TEMAZEPAM 7.5 MG/1
7.5 CAPSULE ORAL NIGHTLY PRN
Qty: 30 CAPSULE | OUTPATIENT
Start: 2025-02-20

## 2025-02-20 RX ORDER — TEMAZEPAM 7.5 MG/1
7.5 CAPSULE ORAL NIGHTLY PRN
Qty: 90 CAPSULE | Refills: 1 | Status: SHIPPED | OUTPATIENT
Start: 2025-02-20

## 2025-02-20 NOTE — TELEPHONE ENCOUNTER
----- Message from Milagro sent at 2/20/2025  1:32 PM CST -----  Type:  RX Refill Request    Who Called: pt daughter    Refill or New Rx:refill    RX Name and Strength:temazepam (RESTORIL) 7.5 MG Cap    Preferred Pharmacy with phone number:[facility]/pharmacy #[id number] - [facility] [address] [facility] AT [facility]    Local or Mail Order:local    Ordering Provider:rafa    Would the patient rather a call back or a response via [facility]? Call    Best Call Back Number: [phone]    Additional Information:

## 2025-02-24 DIAGNOSIS — Z00.00 ENCOUNTER FOR MEDICARE ANNUAL WELLNESS EXAM: ICD-10-CM

## 2025-07-03 ENCOUNTER — TELEPHONE (OUTPATIENT)
Dept: FAMILY MEDICINE | Facility: CLINIC | Age: OVER 89
End: 2025-07-03
Payer: MEDICARE

## 2025-07-03 DIAGNOSIS — I10 ESSENTIAL HYPERTENSION: Primary | ICD-10-CM

## 2025-07-03 DIAGNOSIS — R73.9 HYPERGLYCEMIA: ICD-10-CM

## 2025-07-03 DIAGNOSIS — E78.00 HYPERCHOLESTEREMIA: ICD-10-CM

## 2025-07-03 NOTE — TELEPHONE ENCOUNTER
Copied from CRM #[id number]. Topic: Appointments - Amb Referral  >> Jul 3, 2025  2:39 PM Walter wrote:  Type: Requesting to speak with nurse        Who Called: PT daughter (Stephanie)   Regarding:  annual blood work  Would the patient rather a call back or a response via [facility]? Call back  Best Call Back Number:    Additional Information:       Pt is s/c for 7/23/25, please order annual lab work.

## 2025-07-03 NOTE — TELEPHONE ENCOUNTER
I have spoken to pt's daughter Stephanie and scheduled pt's labs for 7/18/25. Stephanie confirmed appt.

## 2025-07-18 ENCOUNTER — LAB VISIT (OUTPATIENT)
Dept: LAB | Facility: HOSPITAL | Age: OVER 89
End: 2025-07-18
Attending: STUDENT IN AN ORGANIZED HEALTH CARE EDUCATION/TRAINING PROGRAM
Payer: MEDICARE

## 2025-07-18 DIAGNOSIS — I10 ESSENTIAL HYPERTENSION: ICD-10-CM

## 2025-07-18 DIAGNOSIS — R73.9 HYPERGLYCEMIA: ICD-10-CM

## 2025-07-18 DIAGNOSIS — E78.00 HYPERCHOLESTEREMIA: ICD-10-CM

## 2025-07-18 LAB
ABSOLUTE EOSINOPHIL (OHS): 0.34 K/UL
ABSOLUTE MONOCYTE (OHS): 0.52 K/UL (ref 0.3–1)
ABSOLUTE NEUTROPHIL COUNT (OHS): 3.28 K/UL (ref 1.8–7.7)
ALBUMIN SERPL BCP-MCNC: 3.9 G/DL (ref 3.5–5.2)
ALP SERPL-CCNC: 104 UNIT/L (ref 38–126)
ALT SERPL W/O P-5'-P-CCNC: 13 UNIT/L (ref 10–44)
ANION GAP (OHS): 7 MMOL/L (ref 8–16)
AST SERPL-CCNC: 22 UNIT/L (ref 15–46)
BASOPHILS # BLD AUTO: 0.03 K/UL
BASOPHILS NFR BLD AUTO: 0.4 %
BILIRUB SERPL-MCNC: 2 MG/DL (ref 0.1–1)
BUN SERPL-MCNC: 23 MG/DL (ref 2–20)
CALCIUM SERPL-MCNC: 9.1 MG/DL (ref 8.7–10.5)
CHLORIDE SERPL-SCNC: 108 MMOL/L (ref 95–110)
CHOLEST SERPL-MCNC: 155 MG/DL (ref 120–199)
CHOLEST/HDLC SERPL: 3.6 {RATIO} (ref 2–5)
CO2 SERPL-SCNC: 28 MMOL/L (ref 23–29)
CREAT SERPL-MCNC: 0.7 MG/DL (ref 0.5–1.4)
EAG (OHS): 120 MG/DL (ref 68–131)
ERYTHROCYTE [DISTWIDTH] IN BLOOD BY AUTOMATED COUNT: 13.8 % (ref 11.5–14.5)
GFR SERPLBLD CREATININE-BSD FMLA CKD-EPI: >60 ML/MIN/1.73/M2
GLUCOSE SERPL-MCNC: 106 MG/DL (ref 70–110)
HBA1C MFR BLD: 5.8 % (ref 4–5.6)
HCT VFR BLD AUTO: 41.2 % (ref 40–54)
HDLC SERPL-MCNC: 43 MG/DL (ref 40–75)
HDLC SERPL: 27.7 % (ref 20–50)
HGB BLD-MCNC: 13.7 GM/DL (ref 14–18)
IMM GRANULOCYTES # BLD AUTO: 0.01 K/UL (ref 0–0.04)
IMM GRANULOCYTES NFR BLD AUTO: 0.1 % (ref 0–0.5)
LDLC SERPL CALC-MCNC: 94 MG/DL (ref 63–159)
LYMPHOCYTES # BLD AUTO: 2.74 K/UL (ref 1–4.8)
MCH RBC QN AUTO: 32.2 PG (ref 27–31)
MCHC RBC AUTO-ENTMCNC: 33.3 G/DL (ref 32–36)
MCV RBC AUTO: 97 FL (ref 82–98)
NONHDLC SERPL-MCNC: 112 MG/DL
NUCLEATED RBC (/100WBC) (OHS): 0 /100 WBC
PLATELET # BLD AUTO: 150 K/UL (ref 150–450)
PMV BLD AUTO: 12 FL (ref 9.2–12.9)
POTASSIUM SERPL-SCNC: 4.3 MMOL/L (ref 3.5–5.1)
PROT SERPL-MCNC: 6.6 GM/DL (ref 6–8.4)
RBC # BLD AUTO: 4.26 M/UL (ref 4.6–6.2)
RELATIVE EOSINOPHIL (OHS): 4.9 %
RELATIVE LYMPHOCYTE (OHS): 39.6 % (ref 18–48)
RELATIVE MONOCYTE (OHS): 7.5 % (ref 4–15)
RELATIVE NEUTROPHIL (OHS): 47.5 % (ref 38–73)
SODIUM SERPL-SCNC: 143 MMOL/L (ref 136–145)
TRIGL SERPL-MCNC: 90 MG/DL (ref 30–150)
TSH SERPL-ACNC: 4 UIU/ML (ref 0.4–4)
WBC # BLD AUTO: 6.92 K/UL (ref 3.9–12.7)

## 2025-07-18 PROCEDURE — 80061 LIPID PANEL: CPT | Mod: HCNC,PN

## 2025-07-18 PROCEDURE — 82040 ASSAY OF SERUM ALBUMIN: CPT | Mod: HCNC,PN

## 2025-07-18 PROCEDURE — 85025 COMPLETE CBC W/AUTO DIFF WBC: CPT | Mod: HCNC,PN

## 2025-07-18 PROCEDURE — 84443 ASSAY THYROID STIM HORMONE: CPT | Mod: HCNC,PN

## 2025-07-18 PROCEDURE — 83036 HEMOGLOBIN GLYCOSYLATED A1C: CPT | Mod: HCNC,PN

## 2025-07-18 PROCEDURE — 36415 COLL VENOUS BLD VENIPUNCTURE: CPT | Mod: HCNC,PN

## 2025-07-23 ENCOUNTER — OFFICE VISIT (OUTPATIENT)
Dept: FAMILY MEDICINE | Facility: CLINIC | Age: OVER 89
End: 2025-07-23
Payer: MEDICARE

## 2025-07-23 VITALS
HEART RATE: 98 BPM | BODY MASS INDEX: 27.12 KG/M2 | DIASTOLIC BLOOD PRESSURE: 80 MMHG | OXYGEN SATURATION: 95 % | SYSTOLIC BLOOD PRESSURE: 138 MMHG | HEIGHT: 71 IN | WEIGHT: 193.69 LBS

## 2025-07-23 DIAGNOSIS — N40.0 BENIGN PROSTATIC HYPERPLASIA WITHOUT LOWER URINARY TRACT SYMPTOMS: ICD-10-CM

## 2025-07-23 DIAGNOSIS — Z00.00 WELLNESS EXAMINATION: Primary | ICD-10-CM

## 2025-07-23 DIAGNOSIS — F51.01 PRIMARY INSOMNIA: ICD-10-CM

## 2025-07-23 PROCEDURE — 1126F AMNT PAIN NOTED NONE PRSNT: CPT | Mod: CPTII,S$GLB,, | Performed by: STUDENT IN AN ORGANIZED HEALTH CARE EDUCATION/TRAINING PROGRAM

## 2025-07-23 PROCEDURE — 1159F MED LIST DOCD IN RCRD: CPT | Mod: CPTII,S$GLB,, | Performed by: STUDENT IN AN ORGANIZED HEALTH CARE EDUCATION/TRAINING PROGRAM

## 2025-07-23 PROCEDURE — 1160F RVW MEDS BY RX/DR IN RCRD: CPT | Mod: CPTII,S$GLB,, | Performed by: STUDENT IN AN ORGANIZED HEALTH CARE EDUCATION/TRAINING PROGRAM

## 2025-07-23 PROCEDURE — 99397 PER PM REEVAL EST PAT 65+ YR: CPT | Mod: S$GLB,,, | Performed by: STUDENT IN AN ORGANIZED HEALTH CARE EDUCATION/TRAINING PROGRAM

## 2025-07-23 PROCEDURE — 3288F FALL RISK ASSESSMENT DOCD: CPT | Mod: CPTII,S$GLB,, | Performed by: STUDENT IN AN ORGANIZED HEALTH CARE EDUCATION/TRAINING PROGRAM

## 2025-07-23 PROCEDURE — 1101F PT FALLS ASSESS-DOCD LE1/YR: CPT | Mod: CPTII,S$GLB,, | Performed by: STUDENT IN AN ORGANIZED HEALTH CARE EDUCATION/TRAINING PROGRAM

## 2025-07-23 RX ORDER — TEMAZEPAM 7.5 MG/1
7.5 CAPSULE ORAL NIGHTLY PRN
Qty: 90 CAPSULE | Refills: 3 | Status: SHIPPED | OUTPATIENT
Start: 2025-07-23 | End: 2025-07-23

## 2025-07-23 RX ORDER — TEMAZEPAM 15 MG/1
15 CAPSULE ORAL NIGHTLY PRN
Qty: 90 CAPSULE | Refills: 3 | Status: SHIPPED | OUTPATIENT
Start: 2025-07-23

## 2025-07-23 RX ORDER — FINASTERIDE 5 MG/1
TABLET, FILM COATED ORAL
Qty: 90 TABLET | Refills: 3 | Status: SHIPPED | OUTPATIENT
Start: 2025-07-23

## 2025-07-24 NOTE — PROGRESS NOTES
Patient ID: Gera Moreau is a 89 y.o. male.     Chief Complaint: Annual Exam    HPI  History of Present Illness    Gera presents today for follow up    He reports decreased activity tolerance with fatigue upon exertion, which he attributes to aging. He cannot stand for prolonged periods without significant discomfort. He remains engaged in light activities such as yard work and plant maintenance, though with limitations due to his symptoms.    He experiences bilateral lower extremity swelling with prolonged standing, which resolves with rest and elevation. When standing is minimized, foot swelling is reduced. He takes hydrochlorothiazide intermittently for management of lower extremity swelling.    He requests finasteride for enlarged prostate management. He takes sleep medication at the lowest dose and is considering dose adjustment. He reports taking Advil concurrently with sleep medication to improve sleep.    He reports good appetite and consumes a diet rich in fruits and vegetables.    Comprehensive metabolic panel shows normal liver and kidney function. Blood counts are within normal range. Cholesterol levels and thyroid function tests are within normal limits. No evidence of diabetes.           Review of Systems  Review of Systems   Constitutional:  Negative for fever.   HENT:  Negative for ear pain and sinus pain.    Eyes:  Negative for discharge.   Respiratory:  Negative for cough and shortness of breath.    Cardiovascular:  Negative for chest pain and leg swelling.   Gastrointestinal:  Negative for diarrhea, nausea and vomiting.   Genitourinary:  Negative for urgency.   Musculoskeletal:  Negative for myalgias.   Skin:  Negative for rash.   Neurological:  Negative for weakness and headaches.   Psychiatric/Behavioral:  Negative for depression.    All other systems reviewed and are negative.      Currently Medications  Medications Ordered Prior to Encounter[1]    Physical  Exam  Vitals:    07/23/25  "1050   BP: 138/80   BP Location: Left arm   Pulse: 98   SpO2: 95%   Weight: 87.9 kg (193 lb 10.8 oz)   Height: 5' 11" (1.803 m)      Body mass index is 27.01 kg/m².  Wt Readings from Last 3 Encounters:   07/23/25 87.9 kg (193 lb 10.8 oz)   05/15/24 87.3 kg (192 lb 7.4 oz)   05/15/23 84.2 kg (185 lb 10 oz)       Physical Exam  Vitals and nursing note reviewed.   Constitutional:       General: He is not in acute distress.     Appearance: He is not ill-appearing.   HENT:      Head: Normocephalic and atraumatic.      Right Ear: External ear normal.      Left Ear: External ear normal.      Nose: Nose normal.      Mouth/Throat:      Mouth: Mucous membranes are moist.   Eyes:      Extraocular Movements: Extraocular movements intact.      Conjunctiva/sclera: Conjunctivae normal.   Cardiovascular:      Rate and Rhythm: Normal rate and regular rhythm.      Pulses: Normal pulses.      Heart sounds: No murmur heard.  Pulmonary:      Effort: Pulmonary effort is normal. No respiratory distress.      Breath sounds: No wheezing.   Abdominal:      General: There is no distension.      Palpations: Abdomen is soft. There is no mass.      Tenderness: There is no abdominal tenderness.   Musculoskeletal:         General: No swelling.      Cervical back: Normal range of motion.   Skin:     Coloration: Skin is not jaundiced.      Findings: No rash.   Neurological:      General: No focal deficit present.      Mental Status: He is alert and oriented to person, place, and time.   Psychiatric:         Mood and Affect: Mood normal.         Thought Content: Thought content normal.         Labs:    Complete Blood Count  Lab Results   Component Value Date    RBC 4.26 (L) 07/18/2025    HGB 13.7 (L) 07/18/2025    HCT 41.2 07/18/2025    MCV 97 07/18/2025    MCH 32.2 (H) 07/18/2025    MCHC 33.3 07/18/2025    RDW 13.8 07/18/2025     07/18/2025    MPV 12.0 07/18/2025    GRAN 3.0 05/08/2024    GRAN 44.9 05/08/2024    LYMPH 39.6 07/18/2025    LYMPH " 2.74 07/18/2025    MONO 7.5 07/18/2025    MONO 0.52 07/18/2025    EOS 4.9 07/18/2025    EOS 0.34 07/18/2025    BASO 0.03 05/08/2024    EOSINOPHIL 5.2 05/08/2024    BASOPHIL 0.4 07/18/2025    BASOPHIL 0.03 07/18/2025    DIFFMETHOD Automated 05/08/2024       Comprehensive Metabolic Panel  Lab Results   Component Value Date     07/18/2025    BUN 23 (H) 07/18/2025    CREATININE 0.7 07/18/2025     07/18/2025    K 4.3 07/18/2025     07/18/2025    PROT 6.6 07/18/2025    ALBUMIN 3.9 07/18/2025    BILITOT 2.0 (H) 07/18/2025    AST 22 07/18/2025    ALKPHOS 104 07/18/2025    CO2 28 07/18/2025    ALT 13 07/18/2025    ANIONGAP 7 (L) 07/18/2025       TSH  Lab Results   Component Value Date    TSH 4.000 07/18/2025       Imaging:  X-Ray Ankle Complete 3 View Right  Narrative: 3 view x-ray of the right ankle    Clinical History:     Cellulitis of right lower limb    Findings:     There is no fracture. There is no dislocation.  There is mild prominence of the soft tissue thickness lateral to the right ankle.  There is no radiographic evidence of osteomyelitis.  Impression:      There is mild prominence of the soft tissue thickness lateral to the right ankle.  This is consistent with the patient's history and characteristic of cellulitis.  There is no radiographic evidence of osteomyelitis.  ______________________________________     Electronically signed by: JANEL FLORES MD  Date:     06/13/16  Time:    15:41       Assessment/Plan:  Assessment & Plan    Assessed overall health status, noting excellent lab results including normal blood counts, liver and renal function, cholesterol, and thyroid levels.  Evaluated current medication regimen, including finasteride for enlarged prostate and sleep medication, considering increasing sleep medication dosage from 15 mg to address concerns about effectiveness.  Reviewed reported discontinuation of HCTZ, noting reduced foot swelling when not standing for prolonged  periods.    BENIGN PROSTATIC HYPERPLASIA:  - Continue finasteride for enlarged prostate.  - Sent prescription to [facility] pharmacy.    FATIGUE:  - Gera reports feeling tired when trying to do activities, attributing it to age.    EDEMA:  - Continue hydrochlorothiazide.  - Gera reports feet swelling when standing all day, but not when sitting.  - Noted that the patient has not been taking hydrochlorothiazide regularly.  - Educated the patient about the importance of medication adherence.    INSOMNIA:  - Increased sleep medication to 15 mg.  - Prescribed 90 pills, though pharmacy only provides 30; advised the patient to inquire at pharmacy.  - Noted that the patient takes ibuprofen with sleeping medicine.  - Discussed the patient's consideration of increasing dose from weakest strength to 14 mg.    FOLLOW-UP:  - Follow up in 1 year.         1. Wellness examination    2. Benign prostatic hyperplasia without lower urinary tract symptoms  -     finasteride (PROSCAR) 5 mg tablet; TAKE 1 TABLET EVERY DAY  Dispense: 90 tablet; Refill: 3    3. Primary insomnia  -     Discontinue: temazepam (RESTORIL) 7.5 MG Cap; Take 1 capsule (7.5 mg total) by mouth nightly as needed (insomnia).  Dispense: 90 capsule; Refill: 3  -     temazepam (RESTORIL) 15 mg Cap; Take 1 capsule (15 mg total) by mouth nightly as needed (insomnia).  Dispense: 90 capsule; Refill: 3         Discussed how to stay healthy including: diet, exercise, refraining from smoking and discussed screening exams / tests needed for age, sex and family Hx.    RTC 1 year    Jeferson Hugo MD    This note was generated with the assistance of ambient listening technology. Verbal consent was obtained by the patient and accompanying visitor(s) for the recording of patient appointment to facilitate this note. I attest to having reviewed and edited the generated note for accuracy, though some syntax or spelling errors may persist. Please contact the author of this note for  any clarification.         [1]   Current Outpatient Medications on File Prior to Visit   Medication Sig Dispense Refill    hydroCHLOROthiazide (HYDRODIURIL) 12.5 MG Tab Take 1 tablet (12.5 mg total) by mouth daily as needed (swelling). 90 tablet 2    triamcinolone acetonide 0.1% (KENALOG) 0.1 % paste Place onto teeth 2 (two) times daily. 5 g 3    clobetasol 0.05% (TEMOVATE) 0.05 % Oint Apply topically 2 (two) times daily. for 10 days 60 g 3     No current facility-administered medications on file prior to visit.